# Patient Record
Sex: FEMALE | Race: WHITE | NOT HISPANIC OR LATINO | Employment: FULL TIME | URBAN - METROPOLITAN AREA
[De-identification: names, ages, dates, MRNs, and addresses within clinical notes are randomized per-mention and may not be internally consistent; named-entity substitution may affect disease eponyms.]

---

## 2017-03-29 ENCOUNTER — HOSPITAL ENCOUNTER (OUTPATIENT)
Dept: LAB | Facility: MEDICAL CENTER | Age: 32
End: 2017-03-29
Attending: OBSTETRICS & GYNECOLOGY
Payer: COMMERCIAL

## 2017-03-29 LAB
T3FREE SERPL-MCNC: 3.3 PG/ML (ref 2.4–4.2)
T4 FREE SERPL-MCNC: 0.68 NG/DL (ref 0.53–1.43)
TSH SERPL DL<=0.005 MIU/L-ACNC: 1.8 UIU/ML (ref 0.3–3.7)

## 2017-03-29 PROCEDURE — 84439 ASSAY OF FREE THYROXINE: CPT

## 2017-03-29 PROCEDURE — 36415 COLL VENOUS BLD VENIPUNCTURE: CPT

## 2017-03-29 PROCEDURE — 84443 ASSAY THYROID STIM HORMONE: CPT

## 2017-03-29 PROCEDURE — 84481 FREE ASSAY (FT-3): CPT

## 2017-09-02 ENCOUNTER — HOSPITAL ENCOUNTER (OUTPATIENT)
Dept: LAB | Facility: MEDICAL CENTER | Age: 32
End: 2017-09-02
Attending: FAMILY MEDICINE
Payer: COMMERCIAL

## 2017-09-02 LAB
25(OH)D3 SERPL-MCNC: 29 NG/ML (ref 30–100)
ALBUMIN SERPL BCP-MCNC: 3.2 G/DL (ref 3.2–4.9)
ALBUMIN/GLOB SERPL: 0.8 G/DL
ALP SERPL-CCNC: 78 U/L (ref 30–99)
ALT SERPL-CCNC: 32 U/L (ref 2–50)
ANION GAP SERPL CALC-SCNC: 6 MMOL/L (ref 0–11.9)
AST SERPL-CCNC: 25 U/L (ref 12–45)
BASOPHILS # BLD AUTO: 0.5 % (ref 0–1.8)
BASOPHILS # BLD: 0.03 K/UL (ref 0–0.12)
BILIRUB SERPL-MCNC: 0.4 MG/DL (ref 0.1–1.5)
BUN SERPL-MCNC: 16 MG/DL (ref 8–22)
CALCIUM SERPL-MCNC: 9.7 MG/DL (ref 8.5–10.5)
CHLORIDE SERPL-SCNC: 105 MMOL/L (ref 96–112)
CO2 SERPL-SCNC: 27 MMOL/L (ref 20–33)
CREAT SERPL-MCNC: 0.76 MG/DL (ref 0.5–1.4)
EOSINOPHIL # BLD AUTO: 0.16 K/UL (ref 0–0.51)
EOSINOPHIL NFR BLD: 2.8 % (ref 0–6.9)
ERYTHROCYTE [DISTWIDTH] IN BLOOD BY AUTOMATED COUNT: 41.8 FL (ref 35.9–50)
GFR SERPL CREATININE-BSD FRML MDRD: >60 ML/MIN/1.73 M 2
GLOBULIN SER CALC-MCNC: 4.2 G/DL (ref 1.9–3.5)
GLUCOSE SERPL-MCNC: 81 MG/DL (ref 65–99)
HCT VFR BLD AUTO: 40.6 % (ref 37–47)
HGB BLD-MCNC: 13.3 G/DL (ref 12–16)
IMM GRANULOCYTES # BLD AUTO: 0.01 K/UL (ref 0–0.11)
IMM GRANULOCYTES NFR BLD AUTO: 0.2 % (ref 0–0.9)
LYMPHOCYTES # BLD AUTO: 1.77 K/UL (ref 1–4.8)
LYMPHOCYTES NFR BLD: 30.8 % (ref 22–41)
MCH RBC QN AUTO: 29.4 PG (ref 27–33)
MCHC RBC AUTO-ENTMCNC: 32.8 G/DL (ref 33.6–35)
MCV RBC AUTO: 89.8 FL (ref 81.4–97.8)
MONOCYTES # BLD AUTO: 0.35 K/UL (ref 0–0.85)
MONOCYTES NFR BLD AUTO: 6.1 % (ref 0–13.4)
NEUTROPHILS # BLD AUTO: 3.42 K/UL (ref 2–7.15)
NEUTROPHILS NFR BLD: 59.6 % (ref 44–72)
NRBC # BLD AUTO: 0 K/UL
NRBC BLD AUTO-RTO: 0 /100 WBC
PLATELET # BLD AUTO: 274 K/UL (ref 164–446)
PMV BLD AUTO: 11.2 FL (ref 9–12.9)
POTASSIUM SERPL-SCNC: 4.5 MMOL/L (ref 3.6–5.5)
PROT SERPL-MCNC: 7.4 G/DL (ref 6–8.2)
RBC # BLD AUTO: 4.52 M/UL (ref 4.2–5.4)
SODIUM SERPL-SCNC: 138 MMOL/L (ref 135–145)
VIT B12 SERPL-MCNC: 371 PG/ML (ref 211–911)
WBC # BLD AUTO: 5.7 K/UL (ref 4.8–10.8)

## 2017-09-02 PROCEDURE — 82306 VITAMIN D 25 HYDROXY: CPT

## 2017-09-02 PROCEDURE — 80053 COMPREHEN METABOLIC PANEL: CPT

## 2017-09-02 PROCEDURE — 83721 ASSAY OF BLOOD LIPOPROTEIN: CPT

## 2017-09-02 PROCEDURE — 36415 COLL VENOUS BLD VENIPUNCTURE: CPT

## 2017-09-02 PROCEDURE — 82607 VITAMIN B-12: CPT

## 2017-09-02 PROCEDURE — 85025 COMPLETE CBC W/AUTO DIFF WBC: CPT

## 2017-09-04 LAB — LDLC SERPL-MCNC: 124 MG/DL (ref 0–129)

## 2017-12-07 ENCOUNTER — HOSPITAL ENCOUNTER (OUTPATIENT)
Facility: MEDICAL CENTER | Age: 32
End: 2017-12-07
Attending: OPHTHALMOLOGY | Admitting: OPHTHALMOLOGY
Payer: COMMERCIAL

## 2017-12-07 VITALS
HEIGHT: 68 IN | BODY MASS INDEX: 37.42 KG/M2 | OXYGEN SATURATION: 95 % | TEMPERATURE: 98.2 F | WEIGHT: 246.91 LBS | DIASTOLIC BLOOD PRESSURE: 79 MMHG | SYSTOLIC BLOOD PRESSURE: 122 MMHG | RESPIRATION RATE: 16 BRPM | HEART RATE: 76 BPM

## 2017-12-07 LAB
B-HCG FREE SERPL-ACNC: <5 MIU/ML
IHCGL IHCGL: NEGATIVE MIU/ML

## 2017-12-07 PROCEDURE — A9270 NON-COVERED ITEM OR SERVICE: HCPCS

## 2017-12-07 PROCEDURE — 160048 HCHG OR STATISTICAL LEVEL 1-5: Performed by: OPHTHALMOLOGY

## 2017-12-07 PROCEDURE — 700111 HCHG RX REV CODE 636 W/ 250 OVERRIDE (IP)

## 2017-12-07 PROCEDURE — 700101 HCHG RX REV CODE 250

## 2017-12-07 PROCEDURE — 160036 HCHG PACU - EA ADDL 30 MINS PHASE I: Performed by: OPHTHALMOLOGY

## 2017-12-07 PROCEDURE — 160009 HCHG ANES TIME/MIN: Performed by: OPHTHALMOLOGY

## 2017-12-07 PROCEDURE — 502585 HCHG PACK, MUSCLE: Performed by: OPHTHALMOLOGY

## 2017-12-07 PROCEDURE — 160029 HCHG SURGERY MINUTES - 1ST 30 MINS LEVEL 4: Performed by: OPHTHALMOLOGY

## 2017-12-07 PROCEDURE — 501425 HCHG SPONGE, WECKCEL SPEAR: Performed by: OPHTHALMOLOGY

## 2017-12-07 PROCEDURE — 500447 HCHG DRESSING, TEGADERM 8X12: Performed by: OPHTHALMOLOGY

## 2017-12-07 PROCEDURE — 501836 HCHG SUTURE EYE: Performed by: OPHTHALMOLOGY

## 2017-12-07 PROCEDURE — 700102 HCHG RX REV CODE 250 W/ 637 OVERRIDE(OP)

## 2017-12-07 PROCEDURE — 160002 HCHG RECOVERY MINUTES (STAT): Performed by: OPHTHALMOLOGY

## 2017-12-07 PROCEDURE — 84702 CHORIONIC GONADOTROPIN TEST: CPT

## 2017-12-07 PROCEDURE — 160035 HCHG PACU - 1ST 60 MINS PHASE I: Performed by: OPHTHALMOLOGY

## 2017-12-07 PROCEDURE — 160041 HCHG SURGERY MINUTES - EA ADDL 1 MIN LEVEL 4: Performed by: OPHTHALMOLOGY

## 2017-12-07 PROCEDURE — 500291 HCHG CAUTERY, OPTHTHALMIC: Performed by: OPHTHALMOLOGY

## 2017-12-07 RX ORDER — MOXIFLOXACIN 5 MG/ML
SOLUTION/ DROPS OPHTHALMIC
Status: DISCONTINUED | OUTPATIENT
Start: 2017-12-07 | End: 2017-12-07 | Stop reason: HOSPADM

## 2017-12-07 RX ORDER — BALANCED SALT SOLUTION 6.4; .75; .48; .3; 3.9; 1.7 MG/ML; MG/ML; MG/ML; MG/ML; MG/ML; MG/ML
SOLUTION OPHTHALMIC
Status: DISCONTINUED | OUTPATIENT
Start: 2017-12-07 | End: 2017-12-07 | Stop reason: HOSPADM

## 2017-12-07 RX ORDER — PHENYLEPHRINE HYDROCHLORIDE 25 MG/ML
SOLUTION/ DROPS OPHTHALMIC
Status: COMPLETED
Start: 2017-12-07 | End: 2017-12-07

## 2017-12-07 RX ORDER — ONDANSETRON 2 MG/ML
INJECTION INTRAMUSCULAR; INTRAVENOUS
Status: COMPLETED
Start: 2017-12-07 | End: 2017-12-07

## 2017-12-07 RX ORDER — SODIUM CHLORIDE, SODIUM LACTATE, POTASSIUM CHLORIDE, CALCIUM CHLORIDE 600; 310; 30; 20 MG/100ML; MG/100ML; MG/100ML; MG/100ML
INJECTION, SOLUTION INTRAVENOUS CONTINUOUS
Status: DISCONTINUED | OUTPATIENT
Start: 2017-12-07 | End: 2017-12-07 | Stop reason: HOSPADM

## 2017-12-07 RX ORDER — SCOLOPAMINE TRANSDERMAL SYSTEM 1 MG/1
PATCH, EXTENDED RELEASE TRANSDERMAL
Status: COMPLETED
Start: 2017-12-07 | End: 2017-12-07

## 2017-12-07 RX ORDER — CYCLOPENTOLATE HYDROCHLORIDE 10 MG/ML
SOLUTION/ DROPS OPHTHALMIC
Status: COMPLETED
Start: 2017-12-07 | End: 2017-12-07

## 2017-12-07 RX ADMIN — PHENYLEPHRINE HYDROCHLORIDE 1 DROP: 2.5 SOLUTION/ DROPS OPHTHALMIC at 10:30

## 2017-12-07 RX ADMIN — ONDANSETRON 4 MG: 2 INJECTION INTRAMUSCULAR; INTRAVENOUS at 16:05

## 2017-12-07 RX ADMIN — SODIUM CHLORIDE, SODIUM LACTATE, POTASSIUM CHLORIDE, CALCIUM CHLORIDE: 600; 310; 30; 20 INJECTION, SOLUTION INTRAVENOUS at 11:00

## 2017-12-07 RX ADMIN — SODIUM CHLORIDE, SODIUM LACTATE, POTASSIUM CHLORIDE, CALCIUM CHLORIDE 1000 ML: 600; 310; 30; 20 INJECTION, SOLUTION INTRAVENOUS at 16:06

## 2017-12-07 RX ADMIN — CYCLOPENTOLATE HYDROCHLORIDE 1 DROP: 10 SOLUTION/ DROPS OPHTHALMIC at 10:30

## 2017-12-07 RX ADMIN — SCOPALAMINE 1 PATCH: 1 PATCH, EXTENDED RELEASE TRANSDERMAL at 10:45

## 2017-12-07 ASSESSMENT — PAIN SCALES - GENERAL
PAINLEVEL_OUTOF10: 0
PAINLEVEL_OUTOF10: 2
PAINLEVEL_OUTOF10: 0
PAINLEVEL_OUTOF10: 2
PAINLEVEL_OUTOF10: 2

## 2017-12-08 LAB — EKG IMPRESSION: NORMAL

## 2017-12-08 PROCEDURE — 93005 ELECTROCARDIOGRAM TRACING: CPT | Performed by: EMERGENCY MEDICINE

## 2017-12-08 PROCEDURE — 99284 EMERGENCY DEPT VISIT MOD MDM: CPT

## 2017-12-08 PROCEDURE — 93005 ELECTROCARDIOGRAM TRACING: CPT

## 2017-12-08 PROCEDURE — 80053 COMPREHEN METABOLIC PANEL: CPT

## 2017-12-08 PROCEDURE — 83690 ASSAY OF LIPASE: CPT

## 2017-12-08 PROCEDURE — 85025 COMPLETE CBC W/AUTO DIFF WBC: CPT

## 2017-12-08 ASSESSMENT — PAIN SCALES - GENERAL: PAINLEVEL_OUTOF10: 2

## 2017-12-08 NOTE — DISCHARGE INSTRUCTIONS
ACTIVITY: Rest and take it easy for the first 24 hours.  A responsible adult is recommended to remain with you during that time.  It is normal to feel sleepy.  We encourage you to not do anything that requires balance, judgment or coordination.    MILD FLU-LIKE SYMPTOMS ARE NORMAL. YOU MAY EXPERIENCE GENERALIZED MUSCLE ACHES, THROAT IRRITATION, HEADACHE AND/OR SOME NAUSEA.    FOR 24 HOURS DO NOT:  Drive, operate machinery or run household appliances.  Drink beer or alcoholic beverages.   Make important decisions or sign legal documents.    SPECIAL INSTRUCTIONS: USE EYE DROPS-1 DROP IN EACH EYE THREE TIMES A DAY FOR 5 DAYS    DIET: To avoid nausea, slowly advance diet as tolerated, avoiding spicy or greasy foods for the first day.  Add more substantial food to your diet according to your physician's instructions.  Babies can be fed formula or breast milk as soon as they are hungry.  INCREASE FLUIDS AND FIBER TO AVOID CONSTIPATION.    SURGICAL DRESSING/BATHING: PER MD'S INSTRUCTIONS    FOLLOW-UP APPOINTMENT:  A follow-up appointment should be arranged with your doctor TOMORROW AS DIRECTED;     You should CALL YOUR PHYSICIAN if you develop:  Fever greater than 101 degrees F.  Pain not relieved by medication, or persistent nausea or vomiting.  Excessive bleeding (blood soaking through dressing) or unexpected drainage from the wound.  Extreme redness or swelling around the incision site, drainage of pus or foul smelling drainage.  Inability to urinate or empty your bladder within 8 hours.  Problems with breathing or chest pain.    You should call 911 if you develop problems with breathing or chest pain.  If you are unable to contact your doctor or surgical center, you should go to the nearest emergency room or urgent care center.  Physician's telephone #: [phone]    If any questions arise, call your doctor.  If your doctor is not available, please feel free to call the Surgical Center at [phone].  The Center is  open Monday through Friday from 7AM to 7PM.  You can also call the HEALTH HOTLINE open 24 hours/day, 7 days/week and speak to a nurse at [phone], or toll free at [phone].    A registered nurse may call you a few days after your surgery to see how you are doing after your procedure.    MEDICATIONS: Resume taking daily medication.  Take prescribed pain medication with food.  If no medication is prescribed, you may take non-aspirin pain medication if needed.  PAIN MEDICATION CAN BE VERY CONSTIPATING.  Take a stool softener or laxative such as senokot, pericolace, or milk of magnesia if needed.    Prescription given for NONE.  Last pain medication given at NONE.    If your physician has prescribed pain medication that includes Acetaminophen (Tylenol), do not take additional Acetaminophen (Tylenol) while taking the prescribed medication.    Depression / Suicide Risk    As you are discharged from this [facility] facility, it is important to learn how to keep safe from harming yourself.    Recognize the warning signs:  · Abrupt changes in personality, positive or negative- including increase in energy   · Giving away possessions  · Change in eating patterns- significant weight changes-  positive or negative  · Change in sleeping patterns- unable to sleep or sleeping all the time   · Unwillingness or inability to communicate  · Depression  · Unusual sadness, discouragement and loneliness  · Talk of wanting to die  · Neglect of personal appearance   · Rebelliousness- reckless behavior  · Withdrawal from people/activities they love  · Confusion- inability to concentrate     If you or a loved one observes any of these behaviors or has concerns about self-harm, here's what you can do:  · Talk about it- your feelings and reasons for harming yourself  · Remove any means that you might use to hurt yourself (examples: pills, rope, extension cords, firearm)  · Get professional help from the community (Mental Health,  Substance Abuse, psychological counseling)  · Do not be alone:Call your Safe Contact- someone whom you trust who will be there for you.  · Call your local CRISIS HOTLINE 420-1979 or 303-111-6259  · Call your local Children's Mobile Crisis Response Team [place] (145) 552-2440 or www.MeSixty  · Call the toll free National Suicide Prevention Hotlines   · National Suicide Prevention Lifeline 629-733-VWSA (4826)  · National Hope Line Network 800-SUICIDE (269-7284)

## 2017-12-08 NOTE — OR NURSING
1620: Care resumed, report rec'd from Judith EVANS, pt sleeping soundly, nausea better,  at side,   Pt taking sips of water, hard to open eyes,   1710: d/c instructions discussed with pt and family, pt ready to get up and get dressed,   1725: iv d/c'd, pt placed in wheelchair, awaiting  to bring car around,   1736: pt taken out to car in wheelchair.

## 2017-12-09 ENCOUNTER — HOSPITAL ENCOUNTER (EMERGENCY)
Facility: MEDICAL CENTER | Age: 32
End: 2017-12-09
Attending: EMERGENCY MEDICINE
Payer: COMMERCIAL

## 2017-12-09 VITALS
OXYGEN SATURATION: 96 % | TEMPERATURE: 96.8 F | BODY MASS INDEX: 38.38 KG/M2 | RESPIRATION RATE: 12 BRPM | WEIGHT: 252.43 LBS | SYSTOLIC BLOOD PRESSURE: 112 MMHG | HEART RATE: 65 BPM | DIASTOLIC BLOOD PRESSURE: 66 MMHG

## 2017-12-09 DIAGNOSIS — R10.13 EPIGASTRIC ABDOMINAL PAIN: ICD-10-CM

## 2017-12-09 LAB
ALBUMIN SERPL BCP-MCNC: 4.1 G/DL (ref 3.2–4.9)
ALBUMIN/GLOB SERPL: 1.6 G/DL
ALP SERPL-CCNC: 71 U/L (ref 30–99)
ALT SERPL-CCNC: 37 U/L (ref 2–50)
ANION GAP SERPL CALC-SCNC: 8 MMOL/L (ref 0–11.9)
AST SERPL-CCNC: 35 U/L (ref 12–45)
BASOPHILS # BLD AUTO: 0.3 % (ref 0–1.8)
BASOPHILS # BLD: 0.03 K/UL (ref 0–0.12)
BILIRUB SERPL-MCNC: 0.2 MG/DL (ref 0.1–1.5)
BUN SERPL-MCNC: 24 MG/DL (ref 8–22)
CALCIUM SERPL-MCNC: 9 MG/DL (ref 8.5–10.5)
CHLORIDE SERPL-SCNC: 103 MMOL/L (ref 96–112)
CO2 SERPL-SCNC: 26 MMOL/L (ref 20–33)
CREAT SERPL-MCNC: 0.95 MG/DL (ref 0.5–1.4)
EOSINOPHIL # BLD AUTO: 0.07 K/UL (ref 0–0.51)
EOSINOPHIL NFR BLD: 0.7 % (ref 0–6.9)
ERYTHROCYTE [DISTWIDTH] IN BLOOD BY AUTOMATED COUNT: 40.2 FL (ref 35.9–50)
GFR SERPL CREATININE-BSD FRML MDRD: >60 ML/MIN/1.73 M 2
GLOBULIN SER CALC-MCNC: 2.6 G/DL (ref 1.9–3.5)
GLUCOSE SERPL-MCNC: 99 MG/DL (ref 65–99)
HCT VFR BLD AUTO: 37.5 % (ref 37–47)
HGB BLD-MCNC: 12.8 G/DL (ref 12–16)
IMM GRANULOCYTES # BLD AUTO: 0.03 K/UL (ref 0–0.11)
IMM GRANULOCYTES NFR BLD AUTO: 0.3 % (ref 0–0.9)
LIPASE SERPL-CCNC: 54 U/L (ref 11–82)
LYMPHOCYTES # BLD AUTO: 2.65 K/UL (ref 1–4.8)
LYMPHOCYTES NFR BLD: 27.9 % (ref 22–41)
MCH RBC QN AUTO: 30.5 PG (ref 27–33)
MCHC RBC AUTO-ENTMCNC: 34.1 G/DL (ref 33.6–35)
MCV RBC AUTO: 89.3 FL (ref 81.4–97.8)
MONOCYTES # BLD AUTO: 0.49 K/UL (ref 0–0.85)
MONOCYTES NFR BLD AUTO: 5.2 % (ref 0–13.4)
NEUTROPHILS # BLD AUTO: 6.24 K/UL (ref 2–7.15)
NEUTROPHILS NFR BLD: 65.6 % (ref 44–72)
NRBC # BLD AUTO: 0 K/UL
NRBC BLD AUTO-RTO: 0 /100 WBC
PLATELET # BLD AUTO: 283 K/UL (ref 164–446)
PMV BLD AUTO: 10.4 FL (ref 9–12.9)
POTASSIUM SERPL-SCNC: 3.4 MMOL/L (ref 3.6–5.5)
PROT SERPL-MCNC: 6.7 G/DL (ref 6–8.2)
RBC # BLD AUTO: 4.2 M/UL (ref 4.2–5.4)
SODIUM SERPL-SCNC: 137 MMOL/L (ref 135–145)
WBC # BLD AUTO: 9.5 K/UL (ref 4.8–10.8)

## 2017-12-09 PROCEDURE — 700111 HCHG RX REV CODE 636 W/ 250 OVERRIDE (IP): Performed by: EMERGENCY MEDICINE

## 2017-12-09 PROCEDURE — 96372 THER/PROPH/DIAG INJ SC/IM: CPT

## 2017-12-09 RX ORDER — DICYCLOMINE HYDROCHLORIDE 10 MG/ML
20 INJECTION INTRAMUSCULAR ONCE
Status: COMPLETED | OUTPATIENT
Start: 2017-12-09 | End: 2017-12-09

## 2017-12-09 RX ADMIN — DICYCLOMINE HYDROCHLORIDE 20 MG: 10 INJECTION INTRAMUSCULAR at 02:10

## 2017-12-09 NOTE — OP REPORT
DATE OF SERVICE:  12/07/2017    SURGEON:  Kathleen Mix MD.    ASSISTANT:  Rosmery Singleton.    ANESTHESIA:  General.    ANESTHESIOLOGIST:  Haydee Pickering MD.    COMPLICATIONS:  None.    PREOPERATIVE DIAGNOSIS:  Exotropia.    POSTOPERATIVE DIAGNOSIS:  1.  Previous eye muscle surgery, both eyes.  2.  Bilateral medial rectus resection and advancement on Mersilene suture 6.0   mm.  3.  Bilateral examination under anesthesia.    Risks, benefits, and alternatives to the procedure were discussed with the   patient and she elected to proceed.     DESCRIPTION OF PROCEDURE:  The patient was brought to the operating room suite   in stable condition and inducted under general anesthesia without   complications.  Both eyes were prepped and draped in the usual sterile   ophthalmic fashion.  An inferonasal fornix incision was created to enter   conjunctiva and to Tenon's capsule of the right eye.  A Fitch hook followed   by a Anthony hook was used to hook the medial rectus muscle.  It was noted   that there was copious scarring and a stretched scar between the muscle and the   insertion site.  A 5-0 Mersilene suture on S14 spatula needles was used to   imbricate the muscle belly 5 mm posterior to the original insertion site.  The   intervening stretched scar was clamped and resected.  The cut edge of the   muscle was then placed, 1 mm anterior to the preoperative insertion site.  The   muscle was secured using partial-thickness scleral bites, 6-0 plain gut   sutures were used to close Tenon's capsule and conjunctiva.  The identical   procedure was performed for the left eye.  Prior to the eye muscle surgery,   bilateral examination showed a normal anterior and posterior segments   including dilated ophthalmoscopy.  Maxitrol ointment was placed on both eyes.    The patient tolerated the procedure well.  There were no complications.       ____________________________________     KATHLEEN MIX MD    [facility]    DD:   12/09/2017 14:00:54  DT:  12/09/2017 14:58:40    D#:  [id number]  Job#:  [id number]

## 2017-12-09 NOTE — ED NOTES
"Cassandra Marquez  32 y.o.  Chief Complaint   Patient presents with   • Abdominal Pain     x 1 hour. Denies n/v/d, fevers, or cold chills. \"It started right after I took tylenol with Codeine\"      Pain is mid epigastric. EKG complete. Protocol orders activated, blood drawn and sent  "

## 2017-12-09 NOTE — ED NOTES
" Pt's and SO states \"Give us more information on the medication!\" Pt and SO educated on medication. Her significant other also stating \"get me bottled water and with a cup of ice.\" He is agitated and impolite. Water provided to him. Pt medicated per MAR.  "

## 2017-12-09 NOTE — ED PROVIDER NOTES
"ED Provider Note    Scribed for Russ Tinajero M.D. by Sukh Navarro. 12/9/2017, 1:31 AM.    Primary care provider: Jaime Nuno D.O.  Means of arrival: walk-in  History obtained from: patient  History limited by: none    CHIEF COMPLAINT  Chief Complaint   Patient presents with   • Abdominal Pain     x 1 hour. Denies n/v/d, fevers, or cold chills. \"It started right after I took tylenol with Codeine\"        HPI  Cassandra Marquez is a 32 y.o. female who presents to the Emergency Department for evaluation of generalized abdominal pain onset 1 hour ago. Per patient, she had a recent bilateral strabismus surgery, for which she was prescribed Tylenol with Codeine. Patient reports she took one before going to bed tonight, and began experiencing abdominal pain 15 minutes afterwards. She describes her pain as a \"sharp, cramping\" pain on both sides of her abdomen that has been intermittent since onset.  She explains she has never experienced this type of pain before. The patient rates her pain as moderate. She endorses associated nausea. Patient does not note any modifying factors. The patient states her pain is improved on exam. Patient states she usually does not experience nausea with pain medications. She reports a history of gastric bypass 11 years ago. The patient denies shortness of breath, diarrhea, dysuria, vomiting.     REVIEW OF SYSTEMS  Pertinent positives include right and left sided abdominal pain. Pertinent negatives include shortness of breath, diarrhea, dysuria, nausea, vomiting. All other systems negative.    C.      PAST MEDICAL HISTORY   has a past medical history of Hashimoto's disease.    SURGICAL HISTORY   has a past surgical history that includes gastric bypass laparoscopic; eye surgery; and rectus repair (Bilateral, 12/7/2017).    SOCIAL HISTORY  Social History   Substance Use Topics   • Smoking status: Never Smoker   • Smokeless tobacco: Never Used   • Alcohol use No      Comment: pt " denies      History   Drug Use No       FAMILY HISTORY  Family History   Problem Relation Age of Onset   • Diabetes Father    • Diabetes Maternal Aunt    • Diabetes Paternal Grandmother    • Heart Disease Paternal Grandfather    • Hypertension Paternal Grandfather    • Stroke Paternal Grandfather        CURRENT MEDICATIONS  Home Medications     Reviewed by Neha Hargrove R.N. (Registered Nurse) on 12/08/17 at 2341  Med List Status: Complete   Medication Last Dose Status   acetaminophen-codeine #3 (TYLENOL #3) 300-30 MG Tab 12/8/2017 Active   thyroid (ARMOUR THYROID) 120 MG TABS 12/8/2017 Active                ALLERGIES  Allergies   Allergen Reactions   • Amoxicillin Hives   • Pediazole [Erythromycin-Sulfisoxazole] Rash       PHYSICAL EXAM  VITAL SIGNS: /66   Pulse 71   Temp 36 °C (96.8 °F) (Temporal)   Resp 16   Wt 114.5 kg (252 lb 6.8 oz)   LMP 11/07/2017   SpO2 100%   BMI 38.38 kg/m²     Constitutional: Well developed, Well nourished, mild distress.   HENT: Normocephalic, Atraumatic, Oropharynx moist.   Eyes: Conjunctiva normal, No discharge. No scleral icterus  Cardiovascular: Normal heart rate, Normal rhythm, No murmurs, equal pulses.   Pulmonary: Normal breath sounds, No respiratory distress, No wheezing, No rales, No rhonchi.  Abdomen: Obese. Soft, No tenderness, cannot reproduce pain, No masses, no rebound, no guarding. Negative Ram sign, no McBurney's point tenderness  Back: No CVA tenderness.   Musculoskeletal: No major deformities noted, No tenderness.   Skin: Warm, Dry, No erythema, No rash.   Neurologic: Alert & oriented x 3, Normal motor function,  No focal deficits noted.   Psychiatric: Affect normal, Judgment normal, Mood normal.       LABS  Results for orders placed or performed during the hospital encounter of 12/09/17   CBC WITH DIFFERENTIAL   Result Value Ref Range    WBC 9.5 4.8 - 10.8 K/uL    RBC 4.20 4.20 - 5.40 M/uL    Hemoglobin 12.8 12.0 - 16.0 g/dL    Hematocrit 37.5 37.0 -  47.0 %    MCV 89.3 81.4 - 97.8 fL    MCH 30.5 27.0 - 33.0 pg    MCHC 34.1 33.6 - 35.0 g/dL    RDW 40.2 35.9 - 50.0 fL    Platelet Count 283 164 - 446 K/uL    MPV 10.4 9.0 - 12.9 fL    Neutrophils-Polys 65.60 44.00 - 72.00 %    Lymphocytes 27.90 22.00 - 41.00 %    Monocytes 5.20 0.00 - 13.40 %    Eosinophils 0.70 0.00 - 6.90 %    Basophils 0.30 0.00 - 1.80 %    Immature Granulocytes 0.30 0.00 - 0.90 %    Nucleated RBC 0.00 /100 WBC    Neutrophils (Absolute) 6.24 2.00 - 7.15 K/uL    Lymphs (Absolute) 2.65 1.00 - 4.80 K/uL    Monos (Absolute) 0.49 0.00 - 0.85 K/uL    Eos (Absolute) 0.07 0.00 - 0.51 K/uL    Baso (Absolute) 0.03 0.00 - 0.12 K/uL    Immature Granulocytes (abs) 0.03 0.00 - 0.11 K/uL    NRBC (Absolute) 0.00 K/uL   COMP METABOLIC PANEL   Result Value Ref Range    Sodium 137 135 - 145 mmol/L    Potassium 3.4 (L) 3.6 - 5.5 mmol/L    Chloride 103 96 - 112 mmol/L    Co2 26 20 - 33 mmol/L    Anion Gap 8.0 0.0 - 11.9    Glucose 99 65 - 99 mg/dL    Bun 24 (H) 8 - 22 mg/dL    Creatinine 0.95 0.50 - 1.40 mg/dL    Calcium 9.0 8.5 - 10.5 mg/dL    AST(SGOT) 35 12 - 45 U/L    ALT(SGPT) 37 2 - 50 U/L    Alkaline Phosphatase 71 30 - 99 U/L    Total Bilirubin 0.2 0.1 - 1.5 mg/dL    Albumin 4.1 3.2 - 4.9 g/dL    Total Protein 6.7 6.0 - 8.2 g/dL    Globulin 2.6 1.9 - 3.5 g/dL    A-G Ratio 1.6 g/dL   LIPASE   Result Value Ref Range    Lipase 54 11 - 82 U/L   ESTIMATED GFR   Result Value Ref Range    GFR If African American >60 >60 mL/min/1.73 m 2    GFR If Non African American >60 >60 mL/min/1.73 m 2   EKG (ER)   Result Value Ref Range    Report       [facility] Emergency Dept.    Test Date:  2017  Pt Name:    BARRETT AMARAL               Department: ER  MRN:        [id number]                      Room:  Gender:                                 Technician: [id number]  :        [id number]                   Requested By:ER TRIAGE PROTOCOL  Order #:    [id number]                    Reading  MD:    Measurements  Intervals                                Axis  Rate:       71                           P:          61  WY:         160                          QRS:        57  QRSD:       76                           T:          40  QT:         384  QTc:        418    Interpretive Statements  SINUS RHYTHM  No previous ECG available for comparison        All labs reviewed by me.    EKG  12 Lead EKG interpreted by me shows a normal sinus rhythm at a rate of 71. Axis normal. No ST elevations. No T wave inversions. No prior EKG for comparison. Final impression: Normal EKG.      COURSE & MEDICAL DECISION MAKING  Pertinent Labs & Imaging studies reviewed. (See chart for details)    11:47 AM - Ordered estimated GFR, POC UA, CBC with differential, CMP, Lipase, EKG per protocol.     1:31 AM - Patient seen and examined at bedside. Patient will be treated with 20 mg Bentyl. I updated the patient on her lab results as above. I discussed the plan as above with the patient. She understood and verbalized agreement.      The differential diagnoses include but are not limited to: abdominal cramping, gastroenteritis, cholecystitis, biliary colic.      2:05 AM - Per RN, the patient is feeling much improved after given Bentyl and would like to be discharged. I will reevaluate the patient.     2:08 AM - I reevaluated the patient at bedside. The patient is feeling significantly improved and is ready for discharge. The patient was advised to follow-up with her primary care physician. She was given return precautions and welcomed to return to the ED with new or worsening symptoms. She understood and verbalized agreement.       Medical Decision Making: Patient feels much better and would like to go home. At this point time her labs are unremarkable. Her abdomen is soft nontender do not have a great cause for her pain. It may been some abdominal cramping secondary to the Tylenol with Codeine versus some biliary colic although she has  no pain or tenderness in the right upper quadrant this point in time. Given the normal labs and benign exam think the patient to be discharged home. She was given a dose of Bentyl to help with abdominal cramping. Pain is not ripping or tearing does not convert back to do not think his aortic dissection. Does not show any signs of pancreatitis.   The patient will return for new or worsening symptoms and is stable at the time of discharge.    The patient is referred to a primary physician for blood pressure management, diabetic screening, and for all other preventative health concerns.    DISPOSITION:  Patient will be discharged home in stable condition.    FOLLOW UP:  [address] [facility] [address]  [phone]    Schedule an appointment as soon as possible for a visit in 3 days            FINAL IMPRESSION  1. Epigastric abdominal pain          Sukh DONALDSON (Herman), am scribing for, and in the presence of, Russ Tinajero M.D.    Electronically signed by: Sukh Navarro (Herman), 12/9/2017    Russ DONALDSON M.D. personally performed the services described in this documentation, as scribed by Sukh Navarro in my presence, and it is both accurate and complete.    The note accurately reflects work and decisions made by me.  Russ Tinajero  12/9/2017  2:41 AM

## 2017-12-09 NOTE — DISCHARGE INSTRUCTIONS
Return if you have increasing pain, fever, severe vomiting, blood in vomit, or blood in stool.   Abdominal Pain, Adult  Many things can cause belly (abdominal) pain. Most times, the belly pain is not dangerous. Many cases of belly pain can be watched and treated at home.  HOME CARE   · Do not take medicines that help you go poop (laxatives) unless told to by your doctor.  · Only take medicine as told by your doctor.  · Eat or drink as told by your doctor. Your doctor will tell you if you should be on a special diet.  GET HELP IF:  · You do not know what is causing your belly pain.  · You have belly pain while you are sick to your stomach (nauseous) or have runny poop (diarrhea).  · You have pain while you pee or poop.  · Your belly pain wakes you up at night.  · You have belly pain that gets worse or better when you eat.  · You have belly pain that gets worse when you eat fatty foods.  · You have a fever.  GET HELP RIGHT AWAY IF:   · The pain does not go away within 2 hours.  · You keep throwing up (vomiting).  · The pain changes and is only in the right or left part of the belly.  · You have bloody or tarry looking poop.  MAKE SURE YOU:   · Understand these instructions.  · Will watch your condition.  · Will get help right away if you are not doing well or get worse.     This information is not intended to replace advice given to you by your health care provider. Make sure you discuss any questions you have with your health care provider.     Document Released: 06/05/2009 Document Revised: 01/08/2016 Document Reviewed: 08/27/2014  [employer] Interactive Patient Education ©2016 [employer]

## 2018-03-31 ENCOUNTER — HOSPITAL ENCOUNTER (OUTPATIENT)
Dept: LAB | Facility: MEDICAL CENTER | Age: 33
End: 2018-03-31
Attending: OBSTETRICS & GYNECOLOGY
Payer: COMMERCIAL

## 2018-03-31 LAB
25(OH)D3 SERPL-MCNC: 18 NG/ML (ref 30–100)
ALBUMIN SERPL BCP-MCNC: 4.5 G/DL (ref 3.2–4.9)
ALBUMIN/GLOB SERPL: 1.7 G/DL
ALP SERPL-CCNC: 74 U/L (ref 30–99)
ALT SERPL-CCNC: 28 U/L (ref 2–50)
ANION GAP SERPL CALC-SCNC: 6 MMOL/L (ref 0–11.9)
AST SERPL-CCNC: 22 U/L (ref 12–45)
BASOPHILS # BLD AUTO: 0.6 % (ref 0–1.8)
BASOPHILS # BLD: 0.03 K/UL (ref 0–0.12)
BILIRUB SERPL-MCNC: 0.5 MG/DL (ref 0.1–1.5)
BUN SERPL-MCNC: 16 MG/DL (ref 8–22)
CALCIUM SERPL-MCNC: 9.7 MG/DL (ref 8.5–10.5)
CHLORIDE SERPL-SCNC: 105 MMOL/L (ref 96–112)
CO2 SERPL-SCNC: 27 MMOL/L (ref 20–33)
CREAT SERPL-MCNC: 0.77 MG/DL (ref 0.5–1.4)
EOSINOPHIL # BLD AUTO: 0.14 K/UL (ref 0–0.51)
EOSINOPHIL NFR BLD: 2.7 % (ref 0–6.9)
ERYTHROCYTE [DISTWIDTH] IN BLOOD BY AUTOMATED COUNT: 39.1 FL (ref 35.9–50)
GLOBULIN SER CALC-MCNC: 2.7 G/DL (ref 1.9–3.5)
GLUCOSE SERPL-MCNC: 85 MG/DL (ref 65–99)
HCT VFR BLD AUTO: 40.2 % (ref 37–47)
HGB BLD-MCNC: 13.6 G/DL (ref 12–16)
IMM GRANULOCYTES # BLD AUTO: 0 K/UL (ref 0–0.11)
IMM GRANULOCYTES NFR BLD AUTO: 0 % (ref 0–0.9)
LYMPHOCYTES # BLD AUTO: 1.69 K/UL (ref 1–4.8)
LYMPHOCYTES NFR BLD: 33.1 % (ref 22–41)
MCH RBC QN AUTO: 29.8 PG (ref 27–33)
MCHC RBC AUTO-ENTMCNC: 33.8 G/DL (ref 33.6–35)
MCV RBC AUTO: 88 FL (ref 81.4–97.8)
MONOCYTES # BLD AUTO: 0.33 K/UL (ref 0–0.85)
MONOCYTES NFR BLD AUTO: 6.5 % (ref 0–13.4)
NEUTROPHILS # BLD AUTO: 2.92 K/UL (ref 2–7.15)
NEUTROPHILS NFR BLD: 57.1 % (ref 44–72)
NRBC # BLD AUTO: 0 K/UL
NRBC BLD-RTO: 0 /100 WBC
PLATELET # BLD AUTO: 278 K/UL (ref 164–446)
PMV BLD AUTO: 11 FL (ref 9–12.9)
POTASSIUM SERPL-SCNC: 4.7 MMOL/L (ref 3.6–5.5)
PROT SERPL-MCNC: 7.2 G/DL (ref 6–8.2)
RBC # BLD AUTO: 4.57 M/UL (ref 4.2–5.4)
SODIUM SERPL-SCNC: 138 MMOL/L (ref 135–145)
T3FREE SERPL-MCNC: 2.84 PG/ML (ref 2.4–4.2)
T4 FREE SERPL-MCNC: 0.71 NG/DL (ref 0.53–1.43)
TSH SERPL DL<=0.005 MIU/L-ACNC: 2.8 UIU/ML (ref 0.38–5.33)
WBC # BLD AUTO: 5.1 K/UL (ref 4.8–10.8)

## 2018-03-31 PROCEDURE — 84481 FREE ASSAY (FT-3): CPT

## 2018-03-31 PROCEDURE — 85025 COMPLETE CBC W/AUTO DIFF WBC: CPT

## 2018-03-31 PROCEDURE — 82306 VITAMIN D 25 HYDROXY: CPT

## 2018-03-31 PROCEDURE — 80053 COMPREHEN METABOLIC PANEL: CPT

## 2018-03-31 PROCEDURE — 36415 COLL VENOUS BLD VENIPUNCTURE: CPT

## 2018-03-31 PROCEDURE — 84443 ASSAY THYROID STIM HORMONE: CPT

## 2018-03-31 PROCEDURE — 84439 ASSAY OF FREE THYROXINE: CPT

## 2018-05-19 ENCOUNTER — HOSPITAL ENCOUNTER (OUTPATIENT)
Dept: LAB | Facility: MEDICAL CENTER | Age: 33
End: 2018-05-19
Attending: OBSTETRICS & GYNECOLOGY
Payer: COMMERCIAL

## 2018-05-19 LAB — 25(OH)D3 SERPL-MCNC: 23 NG/ML (ref 30–100)

## 2018-05-19 PROCEDURE — 82306 VITAMIN D 25 HYDROXY: CPT

## 2018-05-19 PROCEDURE — 36415 COLL VENOUS BLD VENIPUNCTURE: CPT

## 2018-08-14 ENCOUNTER — OFFICE VISIT (OUTPATIENT)
Dept: URGENT CARE | Facility: PHYSICIAN GROUP | Age: 33
End: 2018-08-14
Payer: COMMERCIAL

## 2018-08-14 VITALS
SYSTOLIC BLOOD PRESSURE: 102 MMHG | TEMPERATURE: 97.5 F | BODY MASS INDEX: 34.86 KG/M2 | DIASTOLIC BLOOD PRESSURE: 70 MMHG | RESPIRATION RATE: 16 BRPM | WEIGHT: 230 LBS | OXYGEN SATURATION: 97 % | HEART RATE: 82 BPM | HEIGHT: 68 IN
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
## 2018-08-14 DIAGNOSIS — S16.1XXA STRAIN OF NECK MUSCLE, INITIAL ENCOUNTER: ICD-10-CM

## 2018-08-14 PROCEDURE — 99203 OFFICE O/P NEW LOW 30 MIN: CPT | Performed by: PHYSICIAN ASSISTANT

## 2018-08-14 RX ORDER — THYROID 60 MG
TABLET ORAL
Status: ON HOLD | COMMUNITY
Start: 2018-06-26 | End: 2020-06-16

## 2018-08-14 ASSESSMENT — ENCOUNTER SYMPTOMS
COUGH: 0
MYALGIAS: 0
ABDOMINAL PAIN: 0
HEADACHES: 1
DIZZINESS: 0
NAUSEA: 0
FALLS: 0
VOMITING: 0
BACK PAIN: 0
EYE DISCHARGE: 0
EYE REDNESS: 0
FATIGUE: 1
NECK PAIN: 1
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
## 2018-10-04 ENCOUNTER — HOSPITAL ENCOUNTER (OUTPATIENT)
Dept: LAB | Facility: MEDICAL CENTER | Age: 33
End: 2018-10-04
Attending: OBSTETRICS & GYNECOLOGY
Payer: COMMERCIAL

## 2018-10-04 LAB
25(OH)D3 SERPL-MCNC: 20 NG/ML (ref 30–100)
T3FREE SERPL-MCNC: 3.3 PG/ML (ref 2.4–4.2)
T4 FREE SERPL-MCNC: 0.8 NG/DL (ref 0.53–1.43)
TSH SERPL DL<=0.005 MIU/L-ACNC: 3.3 UIU/ML (ref 0.38–5.33)

## 2018-10-04 PROCEDURE — 84439 ASSAY OF FREE THYROXINE: CPT

## 2018-10-04 PROCEDURE — 82306 VITAMIN D 25 HYDROXY: CPT

## 2018-10-04 PROCEDURE — 36415 COLL VENOUS BLD VENIPUNCTURE: CPT

## 2018-10-04 PROCEDURE — 84481 FREE ASSAY (FT-3): CPT

## 2018-10-04 PROCEDURE — 84443 ASSAY THYROID STIM HORMONE: CPT

## 2019-02-12 ENCOUNTER — HOSPITAL ENCOUNTER (OUTPATIENT)
Dept: RADIOLOGY | Facility: MEDICAL CENTER | Age: 34
End: 2019-02-12
Attending: OBSTETRICS & GYNECOLOGY
Payer: COMMERCIAL

## 2019-02-12 DIAGNOSIS — N94.5 SECONDARY DYSMENORRHEA: ICD-10-CM

## 2019-02-12 PROCEDURE — 76830 TRANSVAGINAL US NON-OB: CPT

## 2019-02-19 ENCOUNTER — HOSPITAL ENCOUNTER (OUTPATIENT)
Dept: LAB | Facility: MEDICAL CENTER | Age: 34
End: 2019-02-19
Attending: OBSTETRICS & GYNECOLOGY
Payer: COMMERCIAL

## 2019-02-19 LAB — PROGEST SERPL-MCNC: 0.46 NG/ML

## 2019-02-19 PROCEDURE — 36415 COLL VENOUS BLD VENIPUNCTURE: CPT

## 2019-02-19 PROCEDURE — 84144 ASSAY OF PROGESTERONE: CPT

## 2019-04-06 ENCOUNTER — HOSPITAL ENCOUNTER (OUTPATIENT)
Dept: LAB | Facility: MEDICAL CENTER | Age: 34
End: 2019-04-06
Attending: FAMILY MEDICINE
Payer: COMMERCIAL

## 2019-04-06 LAB
25(OH)D3 SERPL-MCNC: 41 NG/ML (ref 30–100)
T3FREE SERPL-MCNC: 3.19 PG/ML (ref 2.4–4.2)
T4 FREE SERPL-MCNC: 0.67 NG/DL (ref 0.53–1.43)
TSH SERPL DL<=0.005 MIU/L-ACNC: 3.25 UIU/ML (ref 0.38–5.33)

## 2019-04-06 PROCEDURE — 36415 COLL VENOUS BLD VENIPUNCTURE: CPT

## 2019-04-06 PROCEDURE — 84439 ASSAY OF FREE THYROXINE: CPT

## 2019-04-06 PROCEDURE — 84481 FREE ASSAY (FT-3): CPT

## 2019-04-06 PROCEDURE — 84443 ASSAY THYROID STIM HORMONE: CPT

## 2019-04-06 PROCEDURE — 82306 VITAMIN D 25 HYDROXY: CPT

## 2019-11-02 ENCOUNTER — HOSPITAL ENCOUNTER (OUTPATIENT)
Dept: LAB | Facility: MEDICAL CENTER | Age: 34
End: 2019-11-02
Attending: OBSTETRICS & GYNECOLOGY
Payer: COMMERCIAL

## 2019-11-02 LAB
25(OH)D3 SERPL-MCNC: 28 NG/ML (ref 30–100)
ABO GROUP BLD: NORMAL
ALBUMIN SERPL BCP-MCNC: 4 G/DL (ref 3.2–4.9)
ALBUMIN/GLOB SERPL: 1.3 G/DL
ALP SERPL-CCNC: 55 U/L (ref 30–99)
ALT SERPL-CCNC: 28 U/L (ref 2–50)
ANION GAP SERPL CALC-SCNC: 7 MMOL/L (ref 0–11.9)
AST SERPL-CCNC: 19 U/L (ref 12–45)
BASOPHILS # BLD AUTO: 0.3 % (ref 0–1.8)
BASOPHILS # BLD: 0.02 K/UL (ref 0–0.12)
BILIRUB SERPL-MCNC: 0.4 MG/DL (ref 0.1–1.5)
BLD GP AB SCN SERPL QL: NORMAL
BUN SERPL-MCNC: 13 MG/DL (ref 8–22)
CALCIUM SERPL-MCNC: 8.9 MG/DL (ref 8.5–10.5)
CHLORIDE SERPL-SCNC: 106 MMOL/L (ref 96–112)
CO2 SERPL-SCNC: 25 MMOL/L (ref 20–33)
CREAT SERPL-MCNC: 0.83 MG/DL (ref 0.5–1.4)
EOSINOPHIL # BLD AUTO: 0.13 K/UL (ref 0–0.51)
EOSINOPHIL NFR BLD: 2.2 % (ref 0–6.9)
ERYTHROCYTE [DISTWIDTH] IN BLOOD BY AUTOMATED COUNT: 44.5 FL (ref 35.9–50)
GLOBULIN SER CALC-MCNC: 3.1 G/DL (ref 1.9–3.5)
GLUCOSE SERPL-MCNC: 96 MG/DL (ref 65–99)
HBV SURFACE AG SER QL: NEGATIVE
HCT VFR BLD AUTO: 39.3 % (ref 37–47)
HCV AB SER QL: NEGATIVE
HGB BLD-MCNC: 12.9 G/DL (ref 12–16)
HIV 1+2 AB+HIV1 P24 AG SERPL QL IA: NON REACTIVE
IMM GRANULOCYTES # BLD AUTO: 0.02 K/UL (ref 0–0.11)
IMM GRANULOCYTES NFR BLD AUTO: 0.3 % (ref 0–0.9)
LYMPHOCYTES # BLD AUTO: 1.66 K/UL (ref 1–4.8)
LYMPHOCYTES NFR BLD: 27.5 % (ref 22–41)
MCH RBC QN AUTO: 30.4 PG (ref 27–33)
MCHC RBC AUTO-ENTMCNC: 32.8 G/DL (ref 33.6–35)
MCV RBC AUTO: 92.7 FL (ref 81.4–97.8)
MONOCYTES # BLD AUTO: 0.29 K/UL (ref 0–0.85)
MONOCYTES NFR BLD AUTO: 4.8 % (ref 0–13.4)
NEUTROPHILS # BLD AUTO: 3.92 K/UL (ref 2–7.15)
NEUTROPHILS NFR BLD: 64.9 % (ref 44–72)
NRBC # BLD AUTO: 0 K/UL
NRBC BLD-RTO: 0 /100 WBC
PLATELET # BLD AUTO: 260 K/UL (ref 164–446)
PMV BLD AUTO: 11.1 FL (ref 9–12.9)
POTASSIUM SERPL-SCNC: 4.1 MMOL/L (ref 3.6–5.5)
PROT SERPL-MCNC: 7.1 G/DL (ref 6–8.2)
RBC # BLD AUTO: 4.24 M/UL (ref 4.2–5.4)
RH BLD: NORMAL
RUBV AB SER QL: >500 IU/ML
SODIUM SERPL-SCNC: 138 MMOL/L (ref 135–145)
THYROPEROXIDASE AB SERPL-ACNC: 515.3 IU/ML (ref 0–9)
TREPONEMA PALLIDUM IGG+IGM AB [PRESENCE] IN SERUM OR PLASMA BY IMMUNOASSAY: NON REACTIVE
TSH SERPL DL<=0.005 MIU/L-ACNC: 4.03 UIU/ML (ref 0.38–5.33)
WBC # BLD AUTO: 6 K/UL (ref 4.8–10.8)

## 2019-11-02 PROCEDURE — 86780 TREPONEMA PALLIDUM: CPT

## 2019-11-02 PROCEDURE — 85025 COMPLETE CBC W/AUTO DIFF WBC: CPT

## 2019-11-02 PROCEDURE — 86900 BLOOD TYPING SEROLOGIC ABO: CPT

## 2019-11-02 PROCEDURE — 86803 HEPATITIS C AB TEST: CPT

## 2019-11-02 PROCEDURE — 87389 HIV-1 AG W/HIV-1&-2 AB AG IA: CPT

## 2019-11-02 PROCEDURE — 86376 MICROSOMAL ANTIBODY EACH: CPT

## 2019-11-02 PROCEDURE — 86901 BLOOD TYPING SEROLOGIC RH(D): CPT

## 2019-11-02 PROCEDURE — 82306 VITAMIN D 25 HYDROXY: CPT

## 2019-11-02 PROCEDURE — 84443 ASSAY THYROID STIM HORMONE: CPT

## 2019-11-02 PROCEDURE — 86800 THYROGLOBULIN ANTIBODY: CPT

## 2019-11-02 PROCEDURE — 87340 HEPATITIS B SURFACE AG IA: CPT

## 2019-11-02 PROCEDURE — 86850 RBC ANTIBODY SCREEN: CPT

## 2019-11-02 PROCEDURE — 80053 COMPREHEN METABOLIC PANEL: CPT

## 2019-11-02 PROCEDURE — 86762 RUBELLA ANTIBODY: CPT

## 2019-11-02 PROCEDURE — 36415 COLL VENOUS BLD VENIPUNCTURE: CPT

## 2019-11-04 LAB — THYROGLOB AB SERPL-ACNC: <0.9 IU/ML (ref 0–4)

## 2019-11-11 ENCOUNTER — HOSPITAL ENCOUNTER (OUTPATIENT)
Dept: RADIOLOGY | Facility: MEDICAL CENTER | Age: 34
End: 2019-11-11
Attending: OBSTETRICS & GYNECOLOGY
Payer: COMMERCIAL

## 2019-11-11 DIAGNOSIS — N91.1 AMENORRHEA, SECONDARY: ICD-10-CM

## 2019-11-11 PROCEDURE — 76801 OB US < 14 WKS SINGLE FETUS: CPT

## 2019-12-31 ENCOUNTER — HOSPITAL ENCOUNTER (OUTPATIENT)
Dept: LAB | Facility: MEDICAL CENTER | Age: 34
End: 2019-12-31
Attending: OBSTETRICS & GYNECOLOGY
Payer: COMMERCIAL

## 2019-12-31 LAB
ABO GROUP BLD: NORMAL
APPEARANCE UR: ABNORMAL
BACTERIA #/AREA URNS HPF: ABNORMAL /HPF
BASOPHILS # BLD AUTO: 0.3 % (ref 0–1.8)
BASOPHILS # BLD: 0.02 K/UL (ref 0–0.12)
BILIRUB UR QL STRIP.AUTO: NEGATIVE
BLD GP AB SCN SERPL QL: NORMAL
COLOR UR: ABNORMAL
EOSINOPHIL # BLD AUTO: 0.22 K/UL (ref 0–0.51)
EOSINOPHIL NFR BLD: 2.8 % (ref 0–6.9)
EPI CELLS #/AREA URNS HPF: ABNORMAL /HPF
ERYTHROCYTE [DISTWIDTH] IN BLOOD BY AUTOMATED COUNT: 44.8 FL (ref 35.9–50)
GLUCOSE UR STRIP.AUTO-MCNC: NEGATIVE MG/DL
HBV SURFACE AG SER QL: NEGATIVE
HCT VFR BLD AUTO: 39.8 % (ref 37–47)
HCV AB SER QL: NEGATIVE
HGB BLD-MCNC: 13.3 G/DL (ref 12–16)
HIV 1+2 AB+HIV1 P24 AG SERPL QL IA: NON REACTIVE
HYALINE CASTS #/AREA URNS LPF: ABNORMAL /LPF
IMM GRANULOCYTES # BLD AUTO: 0.02 K/UL (ref 0–0.11)
IMM GRANULOCYTES NFR BLD AUTO: 0.3 % (ref 0–0.9)
KETONES UR STRIP.AUTO-MCNC: 15 MG/DL
LEUKOCYTE ESTERASE UR QL STRIP.AUTO: ABNORMAL
LYMPHOCYTES # BLD AUTO: 1.71 K/UL (ref 1–4.8)
LYMPHOCYTES NFR BLD: 21.6 % (ref 22–41)
MCH RBC QN AUTO: 31 PG (ref 27–33)
MCHC RBC AUTO-ENTMCNC: 33.4 G/DL (ref 33.6–35)
MCV RBC AUTO: 92.8 FL (ref 81.4–97.8)
MICRO URNS: ABNORMAL
MONOCYTES # BLD AUTO: 0.43 K/UL (ref 0–0.85)
MONOCYTES NFR BLD AUTO: 5.4 % (ref 0–13.4)
NEUTROPHILS # BLD AUTO: 5.53 K/UL (ref 2–7.15)
NEUTROPHILS NFR BLD: 69.6 % (ref 44–72)
NITRITE UR QL STRIP.AUTO: NEGATIVE
NRBC # BLD AUTO: 0 K/UL
NRBC BLD-RTO: 0 /100 WBC
PH UR STRIP.AUTO: 7.5 [PH] (ref 5–8)
PLATELET # BLD AUTO: 265 K/UL (ref 164–446)
PMV BLD AUTO: 11.1 FL (ref 9–12.9)
PROT UR QL STRIP: 30 MG/DL
RBC # BLD AUTO: 4.29 M/UL (ref 4.2–5.4)
RBC # URNS HPF: ABNORMAL /HPF
RBC UR QL AUTO: NEGATIVE
RH BLD: NORMAL
RUBV AB SER QL: >500 IU/ML
SP GR UR STRIP.AUTO: 1.03
T4 SERPL-MCNC: 13 UG/DL (ref 4–12)
TREPONEMA PALLIDUM IGG+IGM AB [PRESENCE] IN SERUM OR PLASMA BY IMMUNOASSAY: NON REACTIVE
TSH SERPL DL<=0.005 MIU/L-ACNC: 4.29 UIU/ML (ref 0.38–5.33)
UROBILINOGEN UR STRIP.AUTO-MCNC: 1 MG/DL
WBC # BLD AUTO: 7.9 K/UL (ref 4.8–10.8)
WBC #/AREA URNS HPF: ABNORMAL /HPF

## 2019-12-31 PROCEDURE — 86780 TREPONEMA PALLIDUM: CPT

## 2019-12-31 PROCEDURE — 86850 RBC ANTIBODY SCREEN: CPT

## 2019-12-31 PROCEDURE — 86900 BLOOD TYPING SEROLOGIC ABO: CPT

## 2019-12-31 PROCEDURE — 86747 PARVOVIRUS ANTIBODY: CPT | Mod: 91

## 2019-12-31 PROCEDURE — 81001 URINALYSIS AUTO W/SCOPE: CPT

## 2019-12-31 PROCEDURE — 85025 COMPLETE CBC W/AUTO DIFF WBC: CPT

## 2019-12-31 PROCEDURE — 86901 BLOOD TYPING SEROLOGIC RH(D): CPT

## 2019-12-31 PROCEDURE — 86762 RUBELLA ANTIBODY: CPT

## 2019-12-31 PROCEDURE — 87086 URINE CULTURE/COLONY COUNT: CPT

## 2019-12-31 PROCEDURE — 86803 HEPATITIS C AB TEST: CPT

## 2019-12-31 PROCEDURE — 86777 TOXOPLASMA ANTIBODY: CPT

## 2019-12-31 PROCEDURE — 84443 ASSAY THYROID STIM HORMONE: CPT

## 2019-12-31 PROCEDURE — 87340 HEPATITIS B SURFACE AG IA: CPT

## 2019-12-31 PROCEDURE — 87389 HIV-1 AG W/HIV-1&-2 AB AG IA: CPT

## 2019-12-31 PROCEDURE — 36415 COLL VENOUS BLD VENIPUNCTURE: CPT

## 2019-12-31 PROCEDURE — 84436 ASSAY OF TOTAL THYROXINE: CPT

## 2020-01-01 LAB
B19V IGG SER IA-ACNC: 4.94 IV
B19V IGM SER IA-ACNC: 0.1 IV
T GONDII IGG SER-ACNC: <3 IU/ML

## 2020-01-02 LAB
BACTERIA UR CULT: NORMAL
SIGNIFICANT IND 70042: NORMAL
SITE SITE: NORMAL
SOURCE SOURCE: NORMAL

## 2020-03-07 ENCOUNTER — HOSPITAL ENCOUNTER (OUTPATIENT)
Dept: LAB | Facility: MEDICAL CENTER | Age: 35
End: 2020-03-07
Attending: OBSTETRICS & GYNECOLOGY
Payer: COMMERCIAL

## 2020-03-07 LAB
BASOPHILS # BLD AUTO: 0.3 % (ref 0–1.8)
BASOPHILS # BLD: 0.03 K/UL (ref 0–0.12)
EOSINOPHIL # BLD AUTO: 0.26 K/UL (ref 0–0.51)
EOSINOPHIL NFR BLD: 2.5 % (ref 0–6.9)
ERYTHROCYTE [DISTWIDTH] IN BLOOD BY AUTOMATED COUNT: 49.2 FL (ref 35.9–50)
GLUCOSE SERPL-MCNC: 76 MG/DL (ref 65–99)
HCT VFR BLD AUTO: 39.3 % (ref 37–47)
HGB BLD-MCNC: 12.7 G/DL (ref 12–16)
IMM GRANULOCYTES # BLD AUTO: 0.09 K/UL (ref 0–0.11)
IMM GRANULOCYTES NFR BLD AUTO: 0.9 % (ref 0–0.9)
LYMPHOCYTES # BLD AUTO: 1.45 K/UL (ref 1–4.8)
LYMPHOCYTES NFR BLD: 14.1 % (ref 22–41)
MCH RBC QN AUTO: 31.3 PG (ref 27–33)
MCHC RBC AUTO-ENTMCNC: 32.3 G/DL (ref 33.6–35)
MCV RBC AUTO: 96.8 FL (ref 81.4–97.8)
MONOCYTES # BLD AUTO: 0.47 K/UL (ref 0–0.85)
MONOCYTES NFR BLD AUTO: 4.6 % (ref 0–13.4)
NEUTROPHILS # BLD AUTO: 8 K/UL (ref 2–7.15)
NEUTROPHILS NFR BLD: 77.6 % (ref 44–72)
NRBC # BLD AUTO: 0 K/UL
NRBC BLD-RTO: 0 /100 WBC
PLATELET # BLD AUTO: 262 K/UL (ref 164–446)
PMV BLD AUTO: 11.5 FL (ref 9–12.9)
RBC # BLD AUTO: 4.06 M/UL (ref 4.2–5.4)
T4 FREE SERPL-MCNC: 0.73 NG/DL (ref 0.53–1.43)
TREPONEMA PALLIDUM IGG+IGM AB [PRESENCE] IN SERUM OR PLASMA BY IMMUNOASSAY: NON REACTIVE
TSH SERPL DL<=0.005 MIU/L-ACNC: 2.26 UIU/ML (ref 0.38–5.33)
WBC # BLD AUTO: 10.3 K/UL (ref 4.8–10.8)

## 2020-03-07 PROCEDURE — 86780 TREPONEMA PALLIDUM: CPT

## 2020-03-07 PROCEDURE — 82947 ASSAY GLUCOSE BLOOD QUANT: CPT

## 2020-03-07 PROCEDURE — 84443 ASSAY THYROID STIM HORMONE: CPT

## 2020-03-07 PROCEDURE — 84439 ASSAY OF FREE THYROXINE: CPT

## 2020-03-07 PROCEDURE — 36415 COLL VENOUS BLD VENIPUNCTURE: CPT

## 2020-03-07 PROCEDURE — 85025 COMPLETE CBC W/AUTO DIFF WBC: CPT

## 2020-05-26 ENCOUNTER — HOSPITAL ENCOUNTER (OUTPATIENT)
Dept: LAB | Facility: MEDICAL CENTER | Age: 35
End: 2020-05-26
Attending: OBSTETRICS & GYNECOLOGY
Payer: COMMERCIAL

## 2020-05-26 LAB
ALBUMIN SERPL BCP-MCNC: 3.3 G/DL (ref 3.2–4.9)
ALBUMIN/GLOB SERPL: 1.2 G/DL
ALP SERPL-CCNC: 89 U/L (ref 30–99)
ALT SERPL-CCNC: 24 U/L (ref 2–50)
ANION GAP SERPL CALC-SCNC: 11 MMOL/L (ref 7–16)
AST SERPL-CCNC: 26 U/L (ref 12–45)
BASOPHILS # BLD AUTO: 0.3 % (ref 0–1.8)
BASOPHILS # BLD: 0.03 K/UL (ref 0–0.12)
BILIRUB SERPL-MCNC: 0.2 MG/DL (ref 0.1–1.5)
BUN SERPL-MCNC: 11 MG/DL (ref 8–22)
CALCIUM SERPL-MCNC: 9 MG/DL (ref 8.5–10.5)
CHLORIDE SERPL-SCNC: 109 MMOL/L (ref 96–112)
CO2 SERPL-SCNC: 19 MMOL/L (ref 20–33)
CREAT SERPL-MCNC: 0.69 MG/DL (ref 0.5–1.4)
EOSINOPHIL # BLD AUTO: 0.15 K/UL (ref 0–0.51)
EOSINOPHIL NFR BLD: 1.4 % (ref 0–6.9)
ERYTHROCYTE [DISTWIDTH] IN BLOOD BY AUTOMATED COUNT: 45.7 FL (ref 35.9–50)
GLOBULIN SER CALC-MCNC: 2.7 G/DL (ref 1.9–3.5)
GLUCOSE SERPL-MCNC: 117 MG/DL (ref 65–99)
HCT VFR BLD AUTO: 35.5 % (ref 37–47)
HGB BLD-MCNC: 11.7 G/DL (ref 12–16)
IMM GRANULOCYTES # BLD AUTO: 0.11 K/UL (ref 0–0.11)
IMM GRANULOCYTES NFR BLD AUTO: 1 % (ref 0–0.9)
LYMPHOCYTES # BLD AUTO: 1.92 K/UL (ref 1–4.8)
LYMPHOCYTES NFR BLD: 17.6 % (ref 22–41)
MCH RBC QN AUTO: 31.1 PG (ref 27–33)
MCHC RBC AUTO-ENTMCNC: 33 G/DL (ref 33.6–35)
MCV RBC AUTO: 94.4 FL (ref 81.4–97.8)
MONOCYTES # BLD AUTO: 0.41 K/UL (ref 0–0.85)
MONOCYTES NFR BLD AUTO: 3.8 % (ref 0–13.4)
NEUTROPHILS # BLD AUTO: 8.31 K/UL (ref 2–7.15)
NEUTROPHILS NFR BLD: 75.9 % (ref 44–72)
NRBC # BLD AUTO: 0 K/UL
NRBC BLD-RTO: 0 /100 WBC
PLATELET # BLD AUTO: 236 K/UL (ref 164–446)
PMV BLD AUTO: 12 FL (ref 9–12.9)
POTASSIUM SERPL-SCNC: 3.9 MMOL/L (ref 3.6–5.5)
PROT SERPL-MCNC: 6 G/DL (ref 6–8.2)
RBC # BLD AUTO: 3.76 M/UL (ref 4.2–5.4)
SODIUM SERPL-SCNC: 139 MMOL/L (ref 135–145)
T4 FREE SERPL-MCNC: 0.99 NG/DL (ref 0.93–1.7)
TSH SERPL DL<=0.005 MIU/L-ACNC: 2.61 UIU/ML (ref 0.38–5.33)
URATE SERPL-MCNC: 4.6 MG/DL (ref 1.9–8.2)
WBC # BLD AUTO: 10.9 K/UL (ref 4.8–10.8)

## 2020-05-26 PROCEDURE — 36415 COLL VENOUS BLD VENIPUNCTURE: CPT

## 2020-05-26 PROCEDURE — 82570 ASSAY OF URINE CREATININE: CPT

## 2020-05-26 PROCEDURE — 85025 COMPLETE CBC W/AUTO DIFF WBC: CPT

## 2020-05-26 PROCEDURE — 84443 ASSAY THYROID STIM HORMONE: CPT

## 2020-05-26 PROCEDURE — 84439 ASSAY OF FREE THYROXINE: CPT

## 2020-05-26 PROCEDURE — 80053 COMPREHEN METABOLIC PANEL: CPT

## 2020-05-26 PROCEDURE — 84550 ASSAY OF BLOOD/URIC ACID: CPT

## 2020-05-26 PROCEDURE — 82043 UR ALBUMIN QUANTITATIVE: CPT

## 2020-05-27 ENCOUNTER — APPOINTMENT (OUTPATIENT)
Dept: RADIOLOGY | Facility: MEDICAL CENTER | Age: 35
End: 2020-05-27
Attending: OBSTETRICS & GYNECOLOGY
Payer: COMMERCIAL

## 2020-05-27 ENCOUNTER — HOSPITAL ENCOUNTER (OUTPATIENT)
Facility: MEDICAL CENTER | Age: 35
End: 2020-05-27
Attending: OBSTETRICS & GYNECOLOGY | Admitting: OBSTETRICS & GYNECOLOGY
Payer: COMMERCIAL

## 2020-05-27 VITALS — DIASTOLIC BLOOD PRESSURE: 80 MMHG | HEART RATE: 90 BPM | SYSTOLIC BLOOD PRESSURE: 140 MMHG

## 2020-05-27 LAB
ALBUMIN SERPL BCP-MCNC: 3.1 G/DL (ref 3.2–4.9)
ALBUMIN/GLOB SERPL: 1.1 G/DL
ALP SERPL-CCNC: 90 U/L (ref 30–99)
ALT SERPL-CCNC: 24 U/L (ref 2–50)
ANION GAP SERPL CALC-SCNC: 12 MMOL/L (ref 7–16)
APPEARANCE UR: CLEAR
AST SERPL-CCNC: 24 U/L (ref 12–45)
BACTERIA #/AREA URNS HPF: ABNORMAL /HPF
BASOPHILS # BLD AUTO: 0.2 % (ref 0–1.8)
BASOPHILS # BLD: 0.02 K/UL (ref 0–0.12)
BILIRUB SERPL-MCNC: 0.3 MG/DL (ref 0.1–1.5)
BILIRUB UR QL STRIP.AUTO: NEGATIVE
BUN SERPL-MCNC: 10 MG/DL (ref 8–22)
CALCIUM SERPL-MCNC: 9.3 MG/DL (ref 8.5–10.5)
CHLORIDE SERPL-SCNC: 101 MMOL/L (ref 96–112)
CO2 SERPL-SCNC: 21 MMOL/L (ref 20–33)
COLOR UR: YELLOW
CREAT SERPL-MCNC: 0.64 MG/DL (ref 0.5–1.4)
CREAT UR-MCNC: 119.19 MG/DL
CREAT UR-MCNC: 14.48 MG/DL
EOSINOPHIL # BLD AUTO: 0.1 K/UL (ref 0–0.51)
EOSINOPHIL NFR BLD: 1 % (ref 0–6.9)
EPI CELLS #/AREA URNS HPF: ABNORMAL /HPF
ERYTHROCYTE [DISTWIDTH] IN BLOOD BY AUTOMATED COUNT: 45.8 FL (ref 35.9–50)
GLOBULIN SER CALC-MCNC: 2.9 G/DL (ref 1.9–3.5)
GLUCOSE SERPL-MCNC: 83 MG/DL (ref 65–99)
GLUCOSE UR STRIP.AUTO-MCNC: NEGATIVE MG/DL
HCT VFR BLD AUTO: 36.3 % (ref 37–47)
HGB BLD-MCNC: 12 G/DL (ref 12–16)
HYALINE CASTS #/AREA URNS LPF: ABNORMAL /LPF
IMM GRANULOCYTES # BLD AUTO: 0.1 K/UL (ref 0–0.11)
IMM GRANULOCYTES NFR BLD AUTO: 1 % (ref 0–0.9)
KETONES UR STRIP.AUTO-MCNC: NEGATIVE MG/DL
LEUKOCYTE ESTERASE UR QL STRIP.AUTO: ABNORMAL
LYMPHOCYTES # BLD AUTO: 1.57 K/UL (ref 1–4.8)
LYMPHOCYTES NFR BLD: 15.1 % (ref 22–41)
MCH RBC QN AUTO: 31.3 PG (ref 27–33)
MCHC RBC AUTO-ENTMCNC: 33.1 G/DL (ref 33.6–35)
MCV RBC AUTO: 94.8 FL (ref 81.4–97.8)
MICRO URNS: ABNORMAL
MICROALBUMIN UR-MCNC: <1.2 MG/DL
MICROALBUMIN/CREAT UR: NORMAL MG/G (ref 0–30)
MONOCYTES # BLD AUTO: 0.44 K/UL (ref 0–0.85)
MONOCYTES NFR BLD AUTO: 4.2 % (ref 0–13.4)
NEUTROPHILS # BLD AUTO: 8.18 K/UL (ref 2–7.15)
NEUTROPHILS NFR BLD: 78.5 % (ref 44–72)
NITRITE UR QL STRIP.AUTO: NEGATIVE
NRBC # BLD AUTO: 0 K/UL
NRBC BLD-RTO: 0 /100 WBC
PH UR STRIP.AUTO: 8 [PH] (ref 5–8)
PLATELET # BLD AUTO: 232 K/UL (ref 164–446)
PMV BLD AUTO: 11 FL (ref 9–12.9)
POTASSIUM SERPL-SCNC: 4.1 MMOL/L (ref 3.6–5.5)
PROT SERPL-MCNC: 6 G/DL (ref 6–8.2)
PROT UR QL STRIP: NEGATIVE MG/DL
PROT UR-MCNC: <4 MG/DL (ref 0–15)
PROT/CREAT UR: NORMAL MG/G (ref 10–107)
RBC # BLD AUTO: 3.83 M/UL (ref 4.2–5.4)
RBC # URNS HPF: ABNORMAL /HPF
RBC UR QL AUTO: NEGATIVE
SODIUM SERPL-SCNC: 134 MMOL/L (ref 135–145)
SP GR UR STRIP.AUTO: 1
URATE SERPL-MCNC: 4.3 MG/DL (ref 1.9–8.2)
UROBILINOGEN UR STRIP.AUTO-MCNC: 0.2 MG/DL
WBC # BLD AUTO: 10.4 K/UL (ref 4.8–10.8)
WBC #/AREA URNS HPF: ABNORMAL /HPF

## 2020-05-27 PROCEDURE — 82570 ASSAY OF URINE CREATININE: CPT

## 2020-05-27 PROCEDURE — 84550 ASSAY OF BLOOD/URIC ACID: CPT

## 2020-05-27 PROCEDURE — 59025 FETAL NON-STRESS TEST: CPT

## 2020-05-27 PROCEDURE — 76815 OB US LIMITED FETUS(S): CPT

## 2020-05-27 PROCEDURE — 81001 URINALYSIS AUTO W/SCOPE: CPT

## 2020-05-27 PROCEDURE — 80053 COMPREHEN METABOLIC PANEL: CPT

## 2020-05-27 PROCEDURE — 84156 ASSAY OF PROTEIN URINE: CPT

## 2020-05-27 PROCEDURE — 36415 COLL VENOUS BLD VENIPUNCTURE: CPT

## 2020-05-27 PROCEDURE — 85025 COMPLETE CBC W/AUTO DIFF WBC: CPT

## 2020-05-27 NOTE — DISCHARGE INSTRUCTIONS
General Instructions:  · If you think you are in labor, time contractions (lying on your left side) from the beginning of one contraction to the beginning of the next contraction for at least one hour.  · Increase fluid intake: you should consume 10-12 8 oz glasses of non-caffeinated fluid per day.  · Report any pressure or burning on urination to your physician.  · Monitor fetal movement: If you notice an absence or decrease in fetal movement, drink a large glass of water and rest on your side.  If there is no increase in movement, call your physician or go to the hospital for further evaluation.  · Report any sudden, sharp abdominal pain.  · Report any bleeding.  Spotting or pinkish discharge is normal after vaginal exam.  You may also spot after sexual intercourse.    Pre-term Labor (<37 weeks):  Call your physician or return to the hospital if:  · You have painless regular contractions more than 4 in one hour.  · Your water breaks (remember time and color).  · You have menstrual-like cramps, a low dull backache or pressure in your pelvis or back.  · Your baby does not move enough to complete the daily kick count (10 movements in 2 hours).  · Your baby moves much less often than on the days before or you have not felt your baby move all day.  · Please review the MEDICATION LIST section of your AFTER VISIT SUMMARY document.  · Take your medication as prescribed    High Blood Pressure:  · Rest on your right or left side.  · Report any severe headaches, dizziness, blurred vision or spots before your eyes.  · Report excessive swelling of your hands, face or feet.  · Report epigastric pain (upper abdominal pain)      Other Instructions:  Please carefully review your entire AFTER VISIT SUMMARY document for all discharge instructions.

## 2020-05-27 NOTE — PROGRESS NOTES
PT presenting from the office after MD did US and possible low LOKESH. PT has also has elevated BP's. Orders for [facility] labs and US obtained.  +FM, denies LOF, VB or UC's    2384 Report to dr Benitez, tracing and results discussed, okay to DC PT    PT to return for decreased FM, LOF, VB or UC's

## 2020-05-31 LAB — GP B STREP DNA SPEC QL NAA+PROBE: NEGATIVE

## 2020-06-16 ENCOUNTER — HOSPITAL ENCOUNTER (INPATIENT)
Facility: MEDICAL CENTER | Age: 35
LOS: 1 days | End: 2020-06-17
Attending: OBSTETRICS & GYNECOLOGY | Admitting: OBSTETRICS & GYNECOLOGY
Payer: COMMERCIAL

## 2020-06-16 ENCOUNTER — ANESTHESIA EVENT (OUTPATIENT)
Dept: ANESTHESIOLOGY | Facility: MEDICAL CENTER | Age: 35
End: 2020-06-16
Payer: COMMERCIAL

## 2020-06-16 ENCOUNTER — ANESTHESIA (OUTPATIENT)
Dept: ANESTHESIOLOGY | Facility: MEDICAL CENTER | Age: 35
End: 2020-06-16
Payer: COMMERCIAL

## 2020-06-16 ENCOUNTER — APPOINTMENT (OUTPATIENT)
Dept: OBGYN | Facility: MEDICAL CENTER | Age: 35
End: 2020-06-16
Attending: OBSTETRICS & GYNECOLOGY
Payer: COMMERCIAL

## 2020-06-16 LAB
ALBUMIN SERPL BCP-MCNC: 2.8 G/DL (ref 3.2–4.9)
ALBUMIN/GLOB SERPL: 1 G/DL
ALP SERPL-CCNC: 88 U/L (ref 30–99)
ALT SERPL-CCNC: 23 U/L (ref 2–50)
ANION GAP SERPL CALC-SCNC: 10 MMOL/L (ref 7–16)
AST SERPL-CCNC: 22 U/L (ref 12–45)
BASOPHILS # BLD AUTO: 0.4 % (ref 0–1.8)
BASOPHILS # BLD: 0.04 K/UL (ref 0–0.12)
BILIRUB SERPL-MCNC: <0.2 MG/DL (ref 0.1–1.5)
BUN SERPL-MCNC: 12 MG/DL (ref 8–22)
CALCIUM SERPL-MCNC: 8.9 MG/DL (ref 8.5–10.5)
CHLORIDE SERPL-SCNC: 107 MMOL/L (ref 96–112)
CO2 SERPL-SCNC: 20 MMOL/L (ref 20–33)
CREAT SERPL-MCNC: 0.59 MG/DL (ref 0.5–1.4)
CREAT UR-MCNC: 28.47 MG/DL
EOSINOPHIL # BLD AUTO: 0.13 K/UL (ref 0–0.51)
EOSINOPHIL NFR BLD: 1.3 % (ref 0–6.9)
ERYTHROCYTE [DISTWIDTH] IN BLOOD BY AUTOMATED COUNT: 47.8 FL (ref 35.9–50)
GLOBULIN SER CALC-MCNC: 2.7 G/DL (ref 1.9–3.5)
GLUCOSE SERPL-MCNC: 87 MG/DL (ref 65–99)
HCT VFR BLD AUTO: 36.2 % (ref 37–47)
HGB BLD-MCNC: 11.7 G/DL (ref 12–16)
HOLDING TUBE BB 8507: NORMAL
IMM GRANULOCYTES # BLD AUTO: 0.08 K/UL (ref 0–0.11)
IMM GRANULOCYTES NFR BLD AUTO: 0.8 % (ref 0–0.9)
LYMPHOCYTES # BLD AUTO: 2.19 K/UL (ref 1–4.8)
LYMPHOCYTES NFR BLD: 22.3 % (ref 22–41)
MCH RBC QN AUTO: 31.5 PG (ref 27–33)
MCHC RBC AUTO-ENTMCNC: 32.3 G/DL (ref 33.6–35)
MCV RBC AUTO: 97.6 FL (ref 81.4–97.8)
MONOCYTES # BLD AUTO: 0.51 K/UL (ref 0–0.85)
MONOCYTES NFR BLD AUTO: 5.2 % (ref 0–13.4)
NEUTROPHILS # BLD AUTO: 6.86 K/UL (ref 2–7.15)
NEUTROPHILS NFR BLD: 70 % (ref 44–72)
NRBC # BLD AUTO: 0 K/UL
NRBC BLD-RTO: 0 /100 WBC
PLATELET # BLD AUTO: 205 K/UL (ref 164–446)
PMV BLD AUTO: 11.3 FL (ref 9–12.9)
POTASSIUM SERPL-SCNC: 3.9 MMOL/L (ref 3.6–5.5)
PROT SERPL-MCNC: 5.5 G/DL (ref 6–8.2)
PROT UR-MCNC: 5 MG/DL (ref 0–15)
PROT/CREAT UR: 176 MG/G (ref 10–107)
RBC # BLD AUTO: 3.71 M/UL (ref 4.2–5.4)
SODIUM SERPL-SCNC: 137 MMOL/L (ref 135–145)
URATE SERPL-MCNC: 5.5 MG/DL (ref 1.9–8.2)
WBC # BLD AUTO: 9.8 K/UL (ref 4.8–10.8)

## 2020-06-16 PROCEDURE — 3E033VJ INTRODUCTION OF OTHER HORMONE INTO PERIPHERAL VEIN, PERCUTANEOUS APPROACH: ICD-10-PCS | Performed by: OBSTETRICS & GYNECOLOGY

## 2020-06-16 PROCEDURE — 700111 HCHG RX REV CODE 636 W/ 250 OVERRIDE (IP): Performed by: OBSTETRICS & GYNECOLOGY

## 2020-06-16 PROCEDURE — 700105 HCHG RX REV CODE 258: Performed by: OBSTETRICS & GYNECOLOGY

## 2020-06-16 PROCEDURE — 82570 ASSAY OF URINE CREATININE: CPT

## 2020-06-16 PROCEDURE — 59409 OBSTETRICAL CARE: CPT

## 2020-06-16 PROCEDURE — 84550 ASSAY OF BLOOD/URIC ACID: CPT

## 2020-06-16 PROCEDURE — 700111 HCHG RX REV CODE 636 W/ 250 OVERRIDE (IP)

## 2020-06-16 PROCEDURE — 770002 HCHG ROOM/CARE - OB PRIVATE (112)

## 2020-06-16 PROCEDURE — 36415 COLL VENOUS BLD VENIPUNCTURE: CPT

## 2020-06-16 PROCEDURE — 3E0H7GC INTRODUCTION OF OTHER THERAPEUTIC SUBSTANCE INTO LOWER GI, VIA NATURAL OR ARTIFICIAL OPENING: ICD-10-PCS | Performed by: OBSTETRICS & GYNECOLOGY

## 2020-06-16 PROCEDURE — 85025 COMPLETE CBC W/AUTO DIFF WBC: CPT

## 2020-06-16 PROCEDURE — 303615 HCHG EPIDURAL/SPINAL ANESTHESIA FOR LABOR

## 2020-06-16 PROCEDURE — 700101 HCHG RX REV CODE 250: Performed by: ANESTHESIOLOGY

## 2020-06-16 PROCEDURE — 304965 HCHG RECOVERY SERVICES

## 2020-06-16 PROCEDURE — 700102 HCHG RX REV CODE 250 W/ 637 OVERRIDE(OP): Performed by: OBSTETRICS & GYNECOLOGY

## 2020-06-16 PROCEDURE — 84156 ASSAY OF PROTEIN URINE: CPT

## 2020-06-16 PROCEDURE — 80053 COMPREHEN METABOLIC PANEL: CPT

## 2020-06-16 PROCEDURE — A9270 NON-COVERED ITEM OR SERVICE: HCPCS | Performed by: OBSTETRICS & GYNECOLOGY

## 2020-06-16 RX ORDER — SODIUM CHLORIDE, SODIUM LACTATE, POTASSIUM CHLORIDE, CALCIUM CHLORIDE 600; 310; 30; 20 MG/100ML; MG/100ML; MG/100ML; MG/100ML
INJECTION, SOLUTION INTRAVENOUS PRN
Status: DISCONTINUED | OUTPATIENT
Start: 2020-06-16 | End: 2020-06-17 | Stop reason: HOSPADM

## 2020-06-16 RX ORDER — ACETAMINOPHEN 325 MG/1
325 TABLET ORAL EVERY 4 HOURS PRN
Status: DISCONTINUED | OUTPATIENT
Start: 2020-06-16 | End: 2020-06-17 | Stop reason: HOSPADM

## 2020-06-16 RX ORDER — LIDOCAINE HYDROCHLORIDE AND EPINEPHRINE 15; 5 MG/ML; UG/ML
INJECTION, SOLUTION EPIDURAL
Status: COMPLETED | OUTPATIENT
Start: 2020-06-16 | End: 2020-06-16

## 2020-06-16 RX ORDER — ROPIVACAINE HYDROCHLORIDE 2 MG/ML
INJECTION, SOLUTION EPIDURAL; INFILTRATION; PERINEURAL CONTINUOUS
Status: CANCELLED | OUTPATIENT
Start: 2020-06-16

## 2020-06-16 RX ORDER — MISOPROSTOL 200 UG/1
1000 TABLET ORAL
Status: COMPLETED | OUTPATIENT
Start: 2020-06-16 | End: 2020-06-16

## 2020-06-16 RX ORDER — MAGNESIUM OXIDE 400 MG/1
400 TABLET ORAL DAILY
Status: ON HOLD | COMMUNITY
End: 2020-06-17

## 2020-06-16 RX ORDER — ONDANSETRON 4 MG/1
4 TABLET, ORALLY DISINTEGRATING ORAL EVERY 6 HOURS PRN
Status: DISCONTINUED | OUTPATIENT
Start: 2020-06-16 | End: 2020-06-17 | Stop reason: HOSPADM

## 2020-06-16 RX ORDER — VITAMIN A ACETATE, BETA CAROTENE, ASCORBIC ACID, CHOLECALCIFEROL, .ALPHA.-TOCOPHEROL ACETATE, DL-, THIAMINE MONONITRATE, RIBOFLAVIN, NIACINAMIDE, PYRIDOXINE HYDROCHLORIDE, FOLIC ACID, CYANOCOBALAMIN, CALCIUM CARBONATE, FERROUS FUMARATE, ZINC OXIDE, CUPRIC OXIDE 3080; 12; 120; 400; 1; 1.84; 3; 20; 22; 920; 25; 200; 27; 10; 2 [IU]/1; UG/1; MG/1; [IU]/1; MG/1; MG/1; MG/1; MG/1; MG/1; [IU]/1; MG/1; MG/1; MG/1; MG/1; MG/1
1 TABLET, FILM COATED ORAL EVERY MORNING
Status: DISCONTINUED | OUTPATIENT
Start: 2020-06-17 | End: 2020-06-17 | Stop reason: HOSPADM

## 2020-06-16 RX ORDER — CARBOPROST TROMETHAMINE 250 UG/ML
250 INJECTION, SOLUTION INTRAMUSCULAR
Status: DISCONTINUED | OUTPATIENT
Start: 2020-06-16 | End: 2020-06-17 | Stop reason: HOSPADM

## 2020-06-16 RX ORDER — HYDROCODONE BITARTRATE AND ACETAMINOPHEN 10; 325 MG/1; MG/1
1 TABLET ORAL EVERY 4 HOURS PRN
Status: DISCONTINUED | OUTPATIENT
Start: 2020-06-16 | End: 2020-06-17 | Stop reason: HOSPADM

## 2020-06-16 RX ORDER — IBUPROFEN 600 MG/1
600 TABLET ORAL EVERY 6 HOURS PRN
Status: DISCONTINUED | OUTPATIENT
Start: 2020-06-16 | End: 2020-06-17 | Stop reason: HOSPADM

## 2020-06-16 RX ORDER — SODIUM CHLORIDE, SODIUM LACTATE, POTASSIUM CHLORIDE, AND CALCIUM CHLORIDE .6; .31; .03; .02 G/100ML; G/100ML; G/100ML; G/100ML
1000 INJECTION, SOLUTION INTRAVENOUS
Status: CANCELLED | OUTPATIENT
Start: 2020-06-16

## 2020-06-16 RX ORDER — ONDANSETRON 2 MG/ML
4 INJECTION INTRAMUSCULAR; INTRAVENOUS EVERY 6 HOURS PRN
Status: DISCONTINUED | OUTPATIENT
Start: 2020-06-16 | End: 2020-06-17 | Stop reason: HOSPADM

## 2020-06-16 RX ORDER — ASPIRIN 81 MG/1
81 TABLET, CHEWABLE ORAL DAILY
Status: ON HOLD | COMMUNITY
End: 2020-06-17

## 2020-06-16 RX ORDER — CARBOPROST TROMETHAMINE 250 UG/ML
250 INJECTION, SOLUTION INTRAMUSCULAR
Status: DISCONTINUED | OUTPATIENT
Start: 2020-06-16 | End: 2020-06-16 | Stop reason: HOSPADM

## 2020-06-16 RX ORDER — SODIUM CHLORIDE, SODIUM LACTATE, POTASSIUM CHLORIDE, CALCIUM CHLORIDE 600; 310; 30; 20 MG/100ML; MG/100ML; MG/100ML; MG/100ML
INJECTION, SOLUTION INTRAVENOUS CONTINUOUS
Status: DISCONTINUED | OUTPATIENT
Start: 2020-06-16 | End: 2020-06-16

## 2020-06-16 RX ORDER — METHYLERGONOVINE MALEATE 0.2 MG/ML
0.2 INJECTION INTRAVENOUS
Status: DISCONTINUED | OUTPATIENT
Start: 2020-06-16 | End: 2020-06-16 | Stop reason: HOSPADM

## 2020-06-16 RX ORDER — DOCUSATE SODIUM 100 MG/1
100 CAPSULE, LIQUID FILLED ORAL 2 TIMES DAILY PRN
Status: DISCONTINUED | OUTPATIENT
Start: 2020-06-16 | End: 2020-06-17 | Stop reason: HOSPADM

## 2020-06-16 RX ORDER — MISOPROSTOL 200 UG/1
600 TABLET ORAL
Status: DISCONTINUED | OUTPATIENT
Start: 2020-06-16 | End: 2020-06-17 | Stop reason: HOSPADM

## 2020-06-16 RX ORDER — SODIUM CHLORIDE, SODIUM LACTATE, POTASSIUM CHLORIDE, CALCIUM CHLORIDE 600; 310; 30; 20 MG/100ML; MG/100ML; MG/100ML; MG/100ML
INJECTION, SOLUTION INTRAVENOUS
Status: COMPLETED
Start: 2020-06-16 | End: 2020-06-16

## 2020-06-16 RX ORDER — SODIUM CHLORIDE, SODIUM LACTATE, POTASSIUM CHLORIDE, AND CALCIUM CHLORIDE .6; .31; .03; .02 G/100ML; G/100ML; G/100ML; G/100ML
250 INJECTION, SOLUTION INTRAVENOUS PRN
Status: CANCELLED | OUTPATIENT
Start: 2020-06-16

## 2020-06-16 RX ORDER — METHYLERGONOVINE MALEATE 0.2 MG/ML
0.2 INJECTION INTRAVENOUS
Status: DISCONTINUED | OUTPATIENT
Start: 2020-06-16 | End: 2020-06-17 | Stop reason: HOSPADM

## 2020-06-16 RX ORDER — LEVOTHYROXINE SODIUM 0.07 MG/1
75 TABLET ORAL
COMMUNITY

## 2020-06-16 RX ORDER — TERBUTALINE SULFATE 1 MG/ML
0.25 INJECTION, SOLUTION SUBCUTANEOUS PRN
Status: DISCONTINUED | OUTPATIENT
Start: 2020-06-16 | End: 2020-06-16 | Stop reason: HOSPADM

## 2020-06-16 RX ORDER — HYDROCODONE BITARTRATE AND ACETAMINOPHEN 5; 325 MG/1; MG/1
1 TABLET ORAL EVERY 4 HOURS PRN
Status: DISCONTINUED | OUTPATIENT
Start: 2020-06-16 | End: 2020-06-17 | Stop reason: HOSPADM

## 2020-06-16 RX ORDER — ROPIVACAINE HYDROCHLORIDE 2 MG/ML
INJECTION, SOLUTION EPIDURAL; INFILTRATION; PERINEURAL
Status: COMPLETED
Start: 2020-06-16 | End: 2020-06-16

## 2020-06-16 RX ORDER — HYDROXYZINE 50 MG/1
50 TABLET, FILM COATED ORAL EVERY 6 HOURS PRN
Status: DISCONTINUED | OUTPATIENT
Start: 2020-06-16 | End: 2020-06-16 | Stop reason: HOSPADM

## 2020-06-16 RX ADMIN — OXYTOCIN 2000 ML/HR: 10 INJECTION, SOLUTION INTRAMUSCULAR; INTRAVENOUS at 19:52

## 2020-06-16 RX ADMIN — IBUPROFEN 600 MG: 600 TABLET ORAL at 22:54

## 2020-06-16 RX ADMIN — OXYTOCIN 1 MILLI-UNITS/MIN: 10 INJECTION, SOLUTION INTRAMUSCULAR; INTRAVENOUS at 08:51

## 2020-06-16 RX ADMIN — SODIUM CHLORIDE, POTASSIUM CHLORIDE, SODIUM LACTATE AND CALCIUM CHLORIDE: 600; 310; 30; 20 INJECTION, SOLUTION INTRAVENOUS at 16:58

## 2020-06-16 RX ADMIN — FENTANYL CITRATE 100 MCG: 50 INJECTION INTRAMUSCULAR; INTRAVENOUS at 17:26

## 2020-06-16 RX ADMIN — OXYTOCIN 125 ML/HR: 10 INJECTION, SOLUTION INTRAMUSCULAR; INTRAVENOUS at 20:24

## 2020-06-16 RX ADMIN — MISOPROSTOL 1000 MCG: 200 TABLET ORAL at 20:17

## 2020-06-16 RX ADMIN — ROPIVACAINE HYDROCHLORIDE 200 MG: 2 INJECTION, SOLUTION EPIDURAL; INFILTRATION at 18:07

## 2020-06-16 RX ADMIN — SODIUM CHLORIDE, POTASSIUM CHLORIDE, SODIUM LACTATE AND CALCIUM CHLORIDE 125 ML: 600; 310; 30; 20 INJECTION, SOLUTION INTRAVENOUS at 08:52

## 2020-06-16 RX ADMIN — LIDOCAINE HYDROCHLORIDE,EPINEPHRINE BITARTRATE 5 ML: 15; .005 INJECTION, SOLUTION EPIDURAL; INFILTRATION; INTRACAUDAL; PERINEURAL at 18:58

## 2020-06-16 ASSESSMENT — PATIENT HEALTH QUESTIONNAIRE - PHQ9
1. LITTLE INTEREST OR PLEASURE IN DOING THINGS: NOT AT ALL
SUM OF ALL RESPONSES TO PHQ9 QUESTIONS 1 AND 2: 0
2. FEELING DOWN, DEPRESSED, IRRITABLE, OR HOPELESS: NOT AT ALL

## 2020-06-16 ASSESSMENT — FIBROSIS 4 INDEX: FIB4 SCORE: 0.78

## 2020-06-16 ASSESSMENT — LIFESTYLE VARIABLES: ALCOHOL_USE: NO

## 2020-06-16 NOTE — CARE PLAN
Problem: Pain  Goal: Alleviation of Pain or a reduction in pain to the patient's comfort goal  Outcome: PROGRESSING AS EXPECTED  Pt desires an epidural for pain management. Will notify RN when ready for interventions.       Problem: Infection  Goal: Will remain free from infection  Outcome: PROGRESSING AS EXPECTED  Pt instructed on hand washing and hand  stations at the door. Pt will be monitored for s/s of infection during her stay.

## 2020-06-16 NOTE — PROGRESS NOTES
0700 Report rcvd from CRISTINA Mane, at bedside. POC discussed, pt care assumed. Pt found to be lying in bed in no apparent distress.   1445 SRom, clear. 3/50/-2.   1540 FSE placed. Pt tolerated well. 3/50/-2.   1630 Pt desires epidural. Unable to get one at this time. Anesthesia in c/s.   1710 Report to CRISTINA Cedeño.

## 2020-06-17 VITALS
DIASTOLIC BLOOD PRESSURE: 70 MMHG | RESPIRATION RATE: 18 BRPM | SYSTOLIC BLOOD PRESSURE: 126 MMHG | HEART RATE: 74 BPM | TEMPERATURE: 97.9 F | OXYGEN SATURATION: 96 % | WEIGHT: 293 LBS | BODY MASS INDEX: 43.4 KG/M2 | HEIGHT: 69 IN

## 2020-06-17 LAB
ERYTHROCYTE [DISTWIDTH] IN BLOOD BY AUTOMATED COUNT: 44.8 FL (ref 35.9–50)
HCT VFR BLD AUTO: 32.5 % (ref 37–47)
HGB BLD-MCNC: 10.9 G/DL (ref 12–16)
MCH RBC QN AUTO: 31.2 PG (ref 27–33)
MCHC RBC AUTO-ENTMCNC: 33.5 G/DL (ref 33.6–35)
MCV RBC AUTO: 93.1 FL (ref 81.4–97.8)
PLATELET # BLD AUTO: 164 K/UL (ref 164–446)
PMV BLD AUTO: 11.4 FL (ref 9–12.9)
RBC # BLD AUTO: 3.49 M/UL (ref 4.2–5.4)
WBC # BLD AUTO: 11.8 K/UL (ref 4.8–10.8)

## 2020-06-17 PROCEDURE — 85027 COMPLETE CBC AUTOMATED: CPT

## 2020-06-17 PROCEDURE — 700102 HCHG RX REV CODE 250 W/ 637 OVERRIDE(OP): Performed by: OBSTETRICS & GYNECOLOGY

## 2020-06-17 PROCEDURE — 36415 COLL VENOUS BLD VENIPUNCTURE: CPT

## 2020-06-17 PROCEDURE — A9270 NON-COVERED ITEM OR SERVICE: HCPCS | Performed by: OBSTETRICS & GYNECOLOGY

## 2020-06-17 RX ORDER — PSEUDOEPHEDRINE HCL 30 MG
100 TABLET ORAL 2 TIMES DAILY PRN
Qty: 60 CAP | Refills: 1 | Status: SHIPPED | OUTPATIENT
Start: 2020-06-17

## 2020-06-17 RX ORDER — LEVOTHYROXINE SODIUM 0.07 MG/1
75 TABLET ORAL
Status: DISCONTINUED | OUTPATIENT
Start: 2020-06-17 | End: 2020-06-17 | Stop reason: HOSPADM

## 2020-06-17 RX ORDER — IBUPROFEN 600 MG/1
600 TABLET ORAL EVERY 6 HOURS PRN
Qty: 30 TAB | Refills: 3 | Status: SHIPPED | OUTPATIENT
Start: 2020-06-17

## 2020-06-17 RX ADMIN — IBUPROFEN 600 MG: 600 TABLET ORAL at 20:16

## 2020-06-17 RX ADMIN — IBUPROFEN 600 MG: 600 TABLET ORAL at 06:17

## 2020-06-17 RX ADMIN — PRENATAL WITH FERROUS FUM AND FOLIC ACID 1 TABLET: 3080; 920; 120; 400; 22; 1.84; 3; 20; 10; 1; 12; 200; 27; 25; 2 TABLET ORAL at 06:17

## 2020-06-17 RX ADMIN — ACETAMINOPHEN 325 MG: 325 TABLET, FILM COATED ORAL at 03:32

## 2020-06-17 RX ADMIN — LEVOTHYROXINE SODIUM 75 MCG: 0.07 TABLET ORAL at 11:23

## 2020-06-17 RX ADMIN — IBUPROFEN 600 MG: 600 TABLET ORAL at 12:49

## 2020-06-17 ASSESSMENT — EDINBURGH POSTNATAL DEPRESSION SCALE (EPDS)
THINGS HAVE BEEN GETTING ON TOP OF ME: NO, I HAVE BEEN COPING AS WELL AS EVER
I HAVE BLAMED MYSELF UNNECESSARILY WHEN THINGS WENT WRONG: NOT VERY OFTEN
THE THOUGHT OF HARMING MYSELF HAS OCCURRED TO ME: NEVER
I HAVE BEEN SO UNHAPPY THAT I HAVE BEEN CRYING: NO, NEVER
I HAVE FELT SCARED OR PANICKY FOR NO GOOD REASON: NO, NOT AT ALL
I HAVE FELT SAD OR MISERABLE: NO, NOT AT ALL
I HAVE BEEN SO UNHAPPY THAT I HAVE HAD DIFFICULTY SLEEPING: NOT AT ALL
I HAVE LOOKED FORWARD WITH ENJOYMENT TO THINGS: AS MUCH AS I EVER DID
I HAVE BEEN ABLE TO LAUGH AND SEE THE FUNNY SIDE OF THINGS: AS MUCH AS I ALWAYS COULD
I HAVE BEEN ANXIOUS OR WORRIED FOR NO GOOD REASON: NO, NOT AT ALL

## 2020-06-17 NOTE — CONSULTS
Met with MOB for an initial lactation visit.  MOB delivered her third baby yesterday, 06/16/20, at 1945 at 38.6 weeks gestation.  Breastfeeding risk factors are: advanced maternal age, chronic hypertension and history of hypothyroidism.  MOB denied having a history of low milk supply.  She stated she breast fed her first son for 1 year and her second son for 2 months.  MOB stated she had difficulty keeping up with her milk supply due to returning to work and stress.    Lactation assistance was offered, but MOB declined.  MOB stated infant is latching onto the breast without difficulty and is feeding well.  MOB denied pain and tissue damage to nipples and areolas with latch.  Infant was observed feeding at the left breast in the cross cradle position when this LC walked into the room.  Minor correction made with positioning.  Encouraged MOB to position infant's chin down towards the bottom of areola.      Breastfeeding Plan:  Offer infant the breast per feeding cues for a minimum of 8 or more breastfeeds in a 24 hour period.    Provided MOB with written information on the outpatient lactation assistance available to her through the [facility] and the [facility] [place] (via Zoom).    MOB verbalized understanding of all information provided to her and denied having any lactation questions and/or concerns at this time.  Encouraged MOB to call for lactation assistance as needed.

## 2020-06-17 NOTE — DISCHARGE PLANNING
Meds-to-Beds: Discharge prescription orders listed below delivered to patient's bedside. RN notified. Patient counseled.       Jimmy Cassandra James   Home Medication Instructions ISIDRA:[id number]    Printed on:06/17/20 1011   Medication Information                      docusate sodium 100 MG Cap  Take 100 mg by mouth 2 times a day as needed for Constipation.             ibuprofen (MOTRIN) 600 MG Tab  Take 1 Tab by mouth every 6 hours as needed (For cramping after delivery; do not give if patient is receiving ketorolac (Toradol)).               Emily Stephens, PharmD

## 2020-06-17 NOTE — ANESTHESIA TIME REPORT
Anesthesia Start and Stop Event Times     Date Time Event    6/16/2020 1857 Anesthesia Start     1909 Ready for Procedure     1945 Anesthesia Stop        Responsible Staff  06/16/20    Name Role Begin End    Robert Stern M.D. Anesth 1857 1945        Preop Diagnosis (Free Text):  Pre-op Diagnosis             Preop Diagnosis (Codes):    Post op Diagnosis  Vaginal delivery      Premium Reason  A. 3PM - 7AM    Comments:

## 2020-06-17 NOTE — ANESTHESIA PREPROCEDURE EVALUATION
Relevant Problems   No relevant active problems   hypothyroid  Obesity      Physical Exam    Airway   Mallampati: III  TM distance: <3 FB  Neck ROM: full       Cardiovascular   Rhythm: regular  Rate: normal     Dental    Pulmonary   Breath sounds clear to auscultation     Abdominal    Neurological - normal exam                 Anesthesia Plan    ASA 2       Plan - epidural   Neuraxial block will be labor analgesia              Pertinent diagnostic labs and testing reviewed    Informed Consent:    Anesthetic plan and risks discussed with patient.

## 2020-06-17 NOTE — ANESTHESIA PROCEDURE NOTES
Epidural Block    Date/Time: 6/16/2020 6:58 PM  Performed by: Robert Stern M.D.  Authorized by: Robert Stern M.D.     Patient Location:  OB  Start Time:  6/16/2020 6:58 PM  Reason for Block: labor analgesia    patient identified, IV checked, site marked, risks and benefits discussed, surgical consent, monitors and equipment checked, pre-op evaluation and timeout performed    Patient Position:  Sitting  Prep: ChloraPrep, patient draped and sterile technique    Monitoring:  Blood pressure, continuous pulse oximetry and heart rate  Approach:  Midline  Location:  L3-L4  Injection Technique:  ALLY air  Skin infiltration:  Lidocaine  Strength:  1%  Dose:  5ml  Needle Type:  Tuohy  Needle Gauge:  17 G  Needle Length:  3.5 in  Loss of resistance::  8  Catheter Size:  19 G  Catheter at Skin Depth:  18  Test Dose Result:  Negative

## 2020-06-17 NOTE — L&D DELIVERY NOTE
LABOR AND DELIVERY    DELIVERY SUMMARY    STAGE I LABOR:    34 yo  at 38w6d based upon a 12w6d u/s performed on 19 who presents to labor and delivery for IOL secondary to chronic hypertension with worsening blood pressures.  At the time of admission, her cervix was 1-2/50%/-2.  She was started on IV pitocin per protocol.  She had SROM clear fluid at 1445.  She received an epidural for labor analgesia and progressed to completely dilated at 1910.  Category I-II tracing during stage I labor.  Occasional variable and early decelerations noted.      STAGE II LABOR:  35 MINUTES.    I was present for a  of a viable female infant at 1945.  The vertex of the infant delivered without difficulty in the direct OP position.  A nuchal cord was palpated and reduced.  The remainder of the infant delivered without difficulty.  The infant was placed on the mother's abdomen.  The infant's mouth and nose were bulb suctioned and delayed cord clamping was performed.  The cord was then clamped and cut.  A segment of cord was clamped and cut for a cord gas but this was held as the APGAR scores were 8 at one minute and 9 at five minutes.    The patient received IV pitocin after delivery of the infant.    The infant weight is pending.    STAGE III LABOR:  7 MINUTES     The placenta delivered intact with a 3-vessel cord at 1952.  Marginal cord insertion noted.      The patient's perineum was examined and found to be intact.    Bimanual uterine massage and exploration performed and small clots removed from the lower uterine segment.      Patient received 1000 mcg of cytotec per rectum x 1 after the above examination.    EBL - 300 cc

## 2020-06-17 NOTE — H&P
DATE OF ADMISSION:  2020    ADMISSION DIAGNOSES:  1.  Intrauterine pregnancy at 38+6 weeks' gestation.  2.  Induction of labor for chronic hypertension with worsening blood   pressures.  3.  Group B streptococcus negative.  4.  Advanced maternal age.  5.  Morbid obesity.  6.  Hypothyroidism.    HISTORY OF PRESENT ILLNESS:  The patient was admitted as a 35-year-old    3, para 2-0-0-2 at 38+6 weeks' gestation based upon a 12+6 week ultrasound   performed on 2019.  The patient presented to labor and delivery for   induction of labor secondary to chronic hypertension with worsening blood   pressures.    The patient reported positive fetal movement.  She denied vaginal bleeding,   leakage of fluid, or regular and painful uterine contractions.  The patient   denied headaches, nausea, vomiting, right upper quadrant pain, vision changes,   and increased swelling.    The patient has [facility] and the   maternal fetal medicine specialist at [facility]   recommended that the patient be induced after 38 weeks' gestation secondary to   her chronic hypertension and advanced maternal age.    PRENATAL CARE:  With Dr. Pam Benitez, first visit on 2019 at 12+2   weeks' gestation, total visits 10, total weight gain 26 pounds.  Third   trimester blood pressures 119-132/69-90.    PRENATAL LABS:  GBS negative on 2020.  GC chlamydia negative, negative.    Hep B surface antigen negative, hep C viral antibody negative, rubella   immune, HIV negative, RPR nonreactive.  Blood type A positive, antibody screen   negative, 1-hour GCT less than 135.    OBSTETRIC ULTRASOUND:  1.  On 2019 at 7+1 weeks' gestation, DAVID 2020.  2.  2019 at 12+6 weeks' gestation, DAVID 2020.  3.  2020 at 17+5 weeks' gestation, DAVID 2020.  4.  2020 at 18+1 week gestation, DAVID 2020.  5.  02/10/2020 at 20+4 weeks' gestation, DAVID 2020.   Transverse   presentation.  Estimated fetal weight 387 g, 14 ounces, anterior low lying   placenta, no previa, cervix 3.9 cm.  6.  On 2020 at 26+4 weeks' gestation, breech presentation.  Estimated   fetal weight 1021 g, 2 pounds 4 ounces, fetal heart rate 147 beats per minute,   anterior placenta, no previa.  7.  2020 at 33+6 weeks' gestation, DAVID 2349 g, 5 pounds 3 ounces, 84th   percentile, female fetus,  vertex presentation, fundal placenta, no previa,   area of bowel that is enlarged measuring 21 mm, normal LOKESH.  Per Dr. Quispe the   bowel dilatation resolved.    OBSTETRICAL HISTORY:  NSVDs x3.    PAST MEDICAL HISTORY:  Hypothyroidism, chronic hypertension.    ISSUES THIS PREGNANCY:  Preexisting hypertension, advanced maternal age,   history of preeclampsia, hypothyroidism.    MEDICATIONS:  Prenatal vitamins and levothyroxine.    SOCIAL HISTORY:  She is .  She denies alcohol, tobacco, or drugs of   abuse.    PHYSICAL EXAMINATION:  VITAL SIGNS:  Blood pressure 144/77, pulse 88, temperature 98.2.  GENERAL:  Alert, awake and oriented x3, in no acute distress.  ABDOMEN:  Gravid, vertex by Leopold's, estimated fetal weight 3400 g.  PELVIC:  Sterile vaginal exam, her cervix is 2 cm, 50% effaced, -2 station.    DIAGNOSTIC DATA:  [place], she is kristina irregularly.  External fetal   monitoring category 1 tracing, reactive, without decelerations.    LABORATORY DATA:  On admission, white count 9.8, hemoglobin 11.7, hematocrit   36.2, platelets 205.    ASSESSMENT AND PLAN:  A 35-year-old  3, para 2-0-0-2 at 38+6 weeks'   gestation based upon a 12+6 week ultrasound performed on 2019 who   presents to labor and delivery for induction of labor secondary to chronic   hypertension with worsening blood pressures, who is GBS negative and has a   favorable cervix.  The patient will be admitted to labor and delivery.  She   will be started on IV Pitocin per protocol.  She may have an epidural for    labor analgesia.  She will undergo amniotomy if needed, but did have SROM   clear fluid at 1445 and we anticipate a normal spontaneous vaginal delivery.    The pediatrician was to be made aware of the  ultrasound that   demonstrated possible bowel dilatation.       ____________________________________     LIZ BELTRAN MD    HTA / NTS    DD:  2020 20:22:19  DT:  2020 23:06:36    D#:  [id number]  Job#:  [id number]

## 2020-06-17 NOTE — PROGRESS NOTES
190: Assumed care of patient, Report Received from MARLIN Miller RN. Gaitan, Gestation 38.6.     Patient in bed, awake, RN at bedside, denies pain, pleasant affect. Epidural infusing. Mario WASHBURN at Bedside. POC discussed, all questions answered.     Patient would like FOBMario at bedside for delivery phase. Patient would like skin to skin, FOB to cut cord after pulsating, breast feeding with Similac as necessary.     EFM used, discussed and in place.     Patient and family deny questions regarding care since arriving at [facility]. Dry erase board updated with RN contact information, discussed. Patient and family encouraged to call RN with all questions, concerns and needs.     : Dr. Benitez at  for SVE and placement of IUPC. Pt C/+1. Room setup for delivery.     :  of viable female by Dr. Benitez.  [place] placed to Mother's abdomen. APGARS 8/9    : Report given to RENEE Watts RN on L&D floor. Stefanie to transfer pt to PP. Fundus firm, VSS.

## 2020-06-17 NOTE — ANESTHESIA QCDR
2019 [facility] Clinical Data Registry (for Quality Improvement)     Postoperative nausea/vomiting risk protocol (Adult = 18 yrs and Pediatric 3-17 yrs)- (430 and 463)  General inhalation anesthetic (NOT TIVA) with PONV risk factors: No  Provision of anti-emetic therapy with at least 2 different classes of agents: N/A  Patient DID NOT receive anti-emetic therapy and reason is documented in Medical Record: N/A    Multimodal Pain Management- (477)  Non-emergent surgery AND patient age >= 18: No  Use of Multimodal Pain Management, two or more drugs and/or interventions, NOT including systemic opioids:   Exception: Documented allergy to multiple classes of analgesics:     Smoking Abstinence (404)  Patient is current smoker (cigarette, pipe, e-cig, marijuanna): No  Elective Surgery:   Abstinence instructions provided prior to day of surgery:   Patient abstained from smoking on day of surgery:     Pre-Op Beta-Blocker in Isolated CABG (44)  Isolated CABG AND patient age >= 18: No  Beta-blocker admin within 24 hours of surgical incision:   Exception:of medical reason(s) for not administering beta blocker within 24 hours prior to surgical incision (e.g., not  indicated,other medical reason):     PACU assessment of acute postoperative pain prior to Anesthesia Care End- Applies to Patients Age = 18- (ABG7)  Initial PACU pain score is which of the following: < 7/10  Patient unable to report pain score: N/A    Post-anesthetic transfer of care checklist/protocol to PACU/ICU- (426 and 427)  Upon conclusion of case, patient transferred to which of the following locations: PACU/Non-ICU  Use of transfer checklist/protocol: Yes  Exclusion: Service Performed in Patient Hospital Room (and thus did not require transfer): N/A  Unplanned admission to ICU related to anesthesia service up through end of PACU care- (MD51)  Unplanned admission to ICU (not initially anticipated at anesthesia start time): No

## 2020-06-17 NOTE — L&D DELIVERY NOTE
DATE OF SERVICE:  2020    Stage I labor:  The patient was admitted as a 35-year-old  3, para   2-0-0-2 at 38+6 weeks' gestation based upon a 12+6 weeks' ultrasound performed   on 2019.  The patient was admitted to labor and delivery for induction   of labor secondary to chronic hypertension with worsening blood pressures.  At   the time of admission, her cervix was 1-2 cm dilated, 50% effaced, -2   station.  The patient was started on IV Pitocin per protocol.  The patient had   spontaneous rupture of membranes of clear fluid at 1445.  The patient   received an epidural for labor analgesia.  The patient progressed to   completely dilated at 1910 on 2020.    Category 1-2 tracing during stage I labor.    Occasional variable and early decelerations noted.    Stage II labor:  35 minutes.  I was present for normal spontaneous vaginal   delivery of a vigorous and viable female infant at 1945.  The patient was in   Romulo for maternal expulsive efforts.  The vertex of the infant delivered   without difficulty in the direct OP position.  A nuchal cord x1 was palpated   and reduced.  The remainder of the infant delivered without difficulty.  The   infant was placed on the mother's abdomen.  The infant's mouth and nose were   bulb suctioned.  Delayed cord clamping was performed.  The cord was then   clamped and cut.  A segment of cord was clamped and cut for cord gas, but this   was held as the Apgars scores were 8 at one minute and 9 at five minutes.    Terminal meconium was noted.    The patient received IV Pitocin after delivery of the infant.  The infant   weight is pending.    Stage III labor:  7 minutes.    The placenta delivered intact with a 3-vessel cord at 1952.  Marginal cord   insertion was noted.  The patient's perineum was examined and found to be   intact.    Bimanual uterine massage and exploration were performed and small clots were   removed from the lower uterine segment.    The  patient received 1000 mcg of Cytotec per rectum x1 after the above   examination.    ESTIMATED BLOOD LOSS:  300  mL.    The patient's fundus was firm 1 below the umbilicus after delivery.       ____________________________________     LIZ BELTRAN MD    HTA / NTS    DD:  06/16/2020 20:14:28  DT:  06/17/2020 00:40:36    D#:  [id number]  Job#:  [id number]

## 2020-06-17 NOTE — PROGRESS NOTES
Report received from Kelly NAVARRO RN. Assumed care of patient. Infant currently latched at patient breast. FOB at bedside and supportive. Patient left leg still significantly numb. Will get patient up to bathroom when numbness subsides.     2215- Patient wheeled up to PP by this RN. Infant in  Patients arms and FOB with belongings accompanying. Report given to RENEE Rojas RN.

## 2020-06-17 NOTE — H&P
"LABOR AND DELIVERY ADMISSION H AND P    ADMISSION DIAGNOSIS:    1.  IUP at 38w6d.  2.  IOL for chronic hypertension with worsening blood pressures.  3.  AMA.  4.  GBS negative.  5.  Hypothyroidism.  6.  Morbid obesity.    HPI: 34 yo  at 38w6d based upon a 12w6d u/s performed on 19 who presents to labor and delivery for IOL secondary to chronic hypertension with worsening blood pressures.    Recent Labs     20  0700   WBC 9.8   RBC 3.71*   HEMOGLOBIN 11.7*   HEMATOCRIT 36.2*   MCV 97.6   MCH 31.5   RDW 47.8   PLATELETCT 205   MPV 11.3   NEUTSPOLYS 70.00   LYMPHOCYTES 22.30   MONOCYTES 5.20   EOSINOPHILS 1.30   BASOPHILS 0.40     /77   Pulse 88   Ht 1.753 m (5' 9\")   Wt (!) 134.3 kg (296 lb)     A, A, and O x 3 NAD    Gravid    Vertex    EFW - 3400 grams    SVE: 2/50%/-2    TOCO: irregular contractions.    EFM: category I tracing.  Reactive and without decelerations.    A/P: 34 yo  at 38w6d based upon a 12w6d u/s performed on 19 who presents to labor and delivery for IOL secondary to chronic hypertension with worsening blood pressures.    1.  IV pitocin.  2.  Epidural.  3.  SROM clear fluid at 1445.  4.  Epidural.  5.  Anticipate .  6.  Prepare for shoulder dystocia and postpartum hemorrhage.  7.  Pediatrician to be aware of  u/s that demonstrated possible bowel dilation.  "

## 2020-06-17 NOTE — DISCHARGE SUMMARY
"DISCHARGE SUMMARY    DATE OF ADMISSION: 2020.    DATE OF DISCHARGE: 2020.    ADMISSION DIAGNOSIS:    1.  IUP at 38w6d.  2.  IOL for chronic hypertension with worsening blood pressures.  3.  AMA.  4.  GBS negative.  5.  Hypothyroidism.  6.  Morbid obesity.    DISCHARGE DIAGNOSIS:    1.  As above.  2.  S/P  without lacerations.    PPD #1    S:  Doing well.  Breast feeding.  Ambulating, voiding spontaneously, and tolerating a regular diet.  Pain is well controlled.  Moderate lochia.  Desires discharge later this evening (at 24 hours).    O:  /79   Pulse 77   Temp 36.2 °C (97.2 °F) (Temporal)   Resp 17   Ht 1.753 m (5' 9\")   Wt (!) 134.3 kg (296 lb)   SpO2 96%     A, A, and O x 3 NAD    FF u/1    No c/c/e    Recent Labs     20  0700 20  0523   WBC 9.8 11.8*   RBC 3.71* 3.49*   HEMOGLOBIN 11.7* 10.9*   HEMATOCRIT 36.2* 32.5*   MCV 97.6 93.1   MCH 31.5 31.2   RDW 47.8 44.8   PLATELETCT 205 164   MPV 11.3 11.4   NEUTSPOLYS 70.00  --    LYMPHOCYTES 22.30  --    MONOCYTES 5.20  --    EOSINOPHILS 1.30  --    BASOPHILS 0.40  --      A/P: PPD #1 s/p  without perineal lacerations.    1.  D/C to home today.  2.  Follow up in 2 weeks for a blood pressure check and 6 weeks for a postpartum examination.  3.  Ambulate TID.  4.  Continue Levothyroxine.  5.  D/C medications include ibuprofen, levothyroxine, and colace.  6.  Continue prenatal vitamins while breast feeding.  "

## 2020-06-17 NOTE — PROGRESS NOTES
1710: Assumed care of pt. AAO, pt desires an epidural when able. POC discussed, pt verbalized understanding.

## 2020-06-17 NOTE — DISCHARGE INSTRUCTIONS
POSTPARTUM DISCHARGE INSTRUCTIONS FOR MOM    YOB: 1985   Age: 35 y.o.               Admit Date: 2020     Discharge Date: 2020  Attending Doctor:  Pam Ramos, *                  Allergies:  Amoxicillin and Pediazole [erythromycin-sulfisoxazole]    Discharged to home by car. Discharged via wheelchair, hospital escort: Yes.  Special equipment needed: Not Applicable  Belongings with: Personal  Be sure to schedule a follow-up appointment with your primary care doctor or any specialists as instructed.     Discharge Plan:   Diet Plan: Discussed  Activity Level: Discussed  Confirmed Follow up Appointment: Patient to Call and Schedule Appointment  Confirmed Symptoms Management: Discussed  Medication Reconciliation Updated: Yes    REASONS TO CALL YOUR OBSTETRICIAN:  1.   Persistent fever or shaking chills (Temperature higher than 100.4)  2.   Heavy bleeding (soaking more than 1 pad per hour); Passing clots  3.   Foul odor from vagina  4.   Mastitis (Breast infection; breast pain, chills, fever, redness)  5.   Urinary pain, burning or frequency  6.   Episiotomy infection  7.   Abdominal incision infection  8.   Severe depression longer than 24 hours    HAND WASHING  · Prior to handling the baby.  · Before breastfeeding or bottle feeding baby.  · After using the bathroom or changing the baby's diaper.    WOUND CARE  Ask your physician for additional care instructions.  In general:    ·  Incision:      · Keep clean and dry.    · Do NOT lift anything heavier than your baby for up to 6 weeks.    · There should not be any opening or pus.      VAGINAL CARE  · Nothing inside vagina for 6 weeks: no sexual intercourse, tampons or douching.  · Bleeding may continue for 2-4 weeks.  Amount may vary.    · Call your physician for heavy bleeding which means soaking more than 1 pad per hour    BIRTH CONTROL  · It is possible to become pregnant at any time after delivery and while breastfeeding.  · Plan  "to discuss a method of birth control with your physician at your follow up visit. visit.    DIET AND ELIMINATION  · Eating more fiber (bran cereal, fruits, and vegetables) and drinking plenty of fluids will help to avoid constipation.  · Urinary frequency after childbirth is normal.    POSTPARTUM BLUES  During the first few days after birth, you may experience a sense of the \"blues\" which may include impatience, irritability or even crying.  These feeling come and go quickly.  However, as many as 1 in 10 women experience emotional symptoms known as postpartum depression.    Postpartum depression:  May start as early as the second or third day after delivery or take several weeks or months to develop.  Symptoms of \"blues\" are present, but are more intense:  Crying spells; loss of appetite; feelings of hopelessness or loss of control; fear of touching the baby; over concern or no concern at all about the baby; little or no concern about your own appearance/caring for yourself; and/or inability to sleep or excessive sleeping.  Contact your physician if you are experiencing any of these symptoms.    Crisis Hotline:  · [place] Crisis Hotline:  6-073-RXMFOTG  Or 1-717.407.8251  · [place] Crisis Hotline:  1-274.639.4573  Or 781-481-7550    PREVENTING SHAKEN BABY:  If you are angry or stressed, PUT THE BABY IN THE CRIB, step away, take some deep breaths, and wait until you are calm to care for the baby.  DO NOT SHAKE THE BABY.  You are not alone, call a supporter for help.    · Crisis Call Center 24/7 crisis line 671-536-5198 or 1-810.236.5239  · You can also text them, text \"ANSWER\" to 952155    QUIT SMOKING/TOBACCO USE:  I understand the use of any tobacco products increases my chance of suffering from future heart disease and could cause other illnesses which may shorten my life. Quitting the use of tobacco products is the single most important thing I can do to improve my health. For further information on smoking / " tobacco cessation call a Toll Free Quit Line at 1-843.620.6243 (*[employer] [place]) or 1-683.498.6132 (American Lung Association) or you can access the web based program at www.lungusa.org.    · [place] Tobacco Users Help Line:  (860) 581-7302       Toll Free: 1-932.980.7486  · Quit Tobacco Program [facility] (339)526-6830    DEPRESSION / SUICIDE RISK:  As you are discharged from this [facility], it is important to learn how to keep safe from harming yourself.    Recognize the warning signs:  · Abrupt changes in personality, positive or negative- including increase in energy   · Giving away possessions  · Change in eating patterns- significant weight changes-  positive or negative  · Change in sleeping patterns- unable to sleep or sleeping all the time   · Unwillingness or inability to communicate  · Depression  · Unusual sadness, discouragement and loneliness  · Talk of wanting to die  · Neglect of personal appearance   · Rebelliousness- reckless behavior  · Withdrawal from people/activities they love  · Confusion- inability to concentrate     If you or a loved one observes any of these behaviors or has concerns about self-harm, here's what you can do:  · Talk about it- your feelings and reasons for harming yourself  · Remove any means that you might use to hurt yourself (examples: pills, rope, extension cords, firearm)  · Get professional help from the community (Mental Health, Substance Abuse, psychological counseling)  · Do not be alone:Call your Safe Contact- someone whom you trust who will be there for you.  · Call your local CRISIS HOTLINE 962-1344 or 585-459-2711  · Call your local Children's Mobile Crisis Response Team [place] (905) 664-4255 or www.[place]  · Call the toll free National Suicide Prevention Hotlines   · National Suicide Prevention Lifeline 033-180-WZGM (2531)  · National Hope Line Network 800-SUICIDE (652-3829)    DISCHARGE  SURVEY:  Thank you for choosing [facility].  We hope we provided you with very good care.  You may be receiving a survey in the mail.  Please fill it out.  Your opinion is valuable to us.    ADDITIONAL EDUCATIONAL MATERIALS GIVEN TO PATIENT:        My signature on this form indicates that:  1.  I have reviewed and understand the above information  2.  My questions regarding this information have been answered to my satisfaction.  3.  I have formulated a plan with my discharge nurse to obtain my prescribed medication for home.    Call for a temp >100.4, heavy vaginal bleeding, foul smelling discharge.  Pelvic rest x 6 weeks.  Ambulate TID.  Callfor signs/symptoms of DVT/PE, mastitis, and postpartum depression

## 2020-06-18 NOTE — PROGRESS NOTES
Received report from Harmony EVANS. Assumed patient care. All questions and concerns answered at this time. Will continue to monitor. Bed rails up x 2, call light within reach.

## 2020-06-18 NOTE — PROGRESS NOTES
Patient discharged home via wheelchair. Patient and FOB provided education and doctor follow-up appointments. Verified bands with patient and infant. Assisted patient with additional questions and concerns. Denies use of transportation to vehicle.

## 2021-03-09 ENCOUNTER — HOSPITAL ENCOUNTER (OUTPATIENT)
Dept: LAB | Facility: MEDICAL CENTER | Age: 36
End: 2021-03-09
Attending: FAMILY MEDICINE
Payer: COMMERCIAL

## 2021-03-09 LAB
25(OH)D3 SERPL-MCNC: 58 NG/ML (ref 30–100)
ALBUMIN SERPL BCP-MCNC: 4.3 G/DL (ref 3.2–4.9)
ALBUMIN/GLOB SERPL: 1.5 G/DL
ALP SERPL-CCNC: 89 U/L (ref 30–99)
ALT SERPL-CCNC: 19 U/L (ref 2–50)
ANION GAP SERPL CALC-SCNC: 11 MMOL/L (ref 7–16)
AST SERPL-CCNC: 21 U/L (ref 12–45)
BILIRUB SERPL-MCNC: 0.4 MG/DL (ref 0.1–1.5)
BUN SERPL-MCNC: 20 MG/DL (ref 8–22)
CALCIUM SERPL-MCNC: 9.4 MG/DL (ref 8.5–10.5)
CHLORIDE SERPL-SCNC: 100 MMOL/L (ref 96–112)
CO2 SERPL-SCNC: 25 MMOL/L (ref 20–33)
CREAT SERPL-MCNC: 0.75 MG/DL (ref 0.5–1.4)
GLOBULIN SER CALC-MCNC: 2.9 G/DL (ref 1.9–3.5)
GLUCOSE SERPL-MCNC: 86 MG/DL (ref 65–99)
POTASSIUM SERPL-SCNC: 4.6 MMOL/L (ref 3.6–5.5)
PROT SERPL-MCNC: 7.2 G/DL (ref 6–8.2)
SODIUM SERPL-SCNC: 136 MMOL/L (ref 135–145)
T4 FREE SERPL-MCNC: 1.14 NG/DL (ref 0.93–1.7)
TSH SERPL DL<=0.005 MIU/L-ACNC: 3.1 UIU/ML (ref 0.38–5.33)

## 2021-03-09 PROCEDURE — 36415 COLL VENOUS BLD VENIPUNCTURE: CPT

## 2021-03-09 PROCEDURE — 84439 ASSAY OF FREE THYROXINE: CPT

## 2021-03-09 PROCEDURE — 82306 VITAMIN D 25 HYDROXY: CPT

## 2021-03-09 PROCEDURE — 84443 ASSAY THYROID STIM HORMONE: CPT

## 2021-03-09 PROCEDURE — 80053 COMPREHEN METABOLIC PANEL: CPT

## 2023-08-05 ENCOUNTER — HOSPITAL ENCOUNTER (OUTPATIENT)
Dept: LAB | Facility: MEDICAL CENTER | Age: 38
End: 2023-08-05
Attending: FAMILY MEDICINE
Payer: COMMERCIAL

## 2023-08-05 LAB
ALBUMIN SERPL BCP-MCNC: 4.6 G/DL (ref 3.2–4.9)
ALBUMIN/GLOB SERPL: 1.6 G/DL
ALP SERPL-CCNC: 84 U/L (ref 30–99)
ALT SERPL-CCNC: 31 U/L (ref 2–50)
ANION GAP SERPL CALC-SCNC: 9 MMOL/L (ref 7–16)
AST SERPL-CCNC: 23 U/L (ref 12–45)
BILIRUB SERPL-MCNC: 0.4 MG/DL (ref 0.1–1.5)
BUN SERPL-MCNC: 16 MG/DL (ref 8–22)
CALCIUM ALBUM COR SERPL-MCNC: 9 MG/DL (ref 8.5–10.5)
CALCIUM SERPL-MCNC: 9.5 MG/DL (ref 8.5–10.5)
CHLORIDE SERPL-SCNC: 103 MMOL/L (ref 96–112)
CHOLEST SERPL-MCNC: 181 MG/DL (ref 100–199)
CO2 SERPL-SCNC: 24 MMOL/L (ref 20–33)
CREAT SERPL-MCNC: 0.94 MG/DL (ref 0.5–1.4)
FASTING STATUS PATIENT QL REPORTED: NORMAL
FOLATE SERPL-MCNC: 21.4 NG/ML
GFR SERPLBLD CREATININE-BSD FMLA CKD-EPI: 78 ML/MIN/1.73 M 2
GLOBULIN SER CALC-MCNC: 2.8 G/DL (ref 1.9–3.5)
GLUCOSE SERPL-MCNC: 92 MG/DL (ref 65–99)
HDLC SERPL-MCNC: 41 MG/DL
LDLC SERPL CALC-MCNC: 119 MG/DL
POTASSIUM SERPL-SCNC: 4.8 MMOL/L (ref 3.6–5.5)
PROT SERPL-MCNC: 7.4 G/DL (ref 6–8.2)
SODIUM SERPL-SCNC: 136 MMOL/L (ref 135–145)
T3FREE SERPL-MCNC: 3.38 PG/ML (ref 2–4.4)
TRIGL SERPL-MCNC: 107 MG/DL (ref 0–149)
VIT B12 SERPL-MCNC: 515 PG/ML (ref 211–911)

## 2023-08-05 PROCEDURE — 36415 COLL VENOUS BLD VENIPUNCTURE: CPT

## 2023-08-05 PROCEDURE — 82746 ASSAY OF FOLIC ACID SERUM: CPT

## 2023-08-05 PROCEDURE — 84481 FREE ASSAY (FT-3): CPT

## 2023-08-05 PROCEDURE — 82607 VITAMIN B-12: CPT

## 2023-08-05 PROCEDURE — 80061 LIPID PANEL: CPT

## 2023-08-05 PROCEDURE — 80053 COMPREHEN METABOLIC PANEL: CPT

## 2024-07-18 ENCOUNTER — HOSPITAL ENCOUNTER (OUTPATIENT)
Dept: RADIOLOGY | Facility: MEDICAL CENTER | Age: 39
End: 2024-07-18
Attending: OBSTETRICS & GYNECOLOGY
Payer: COMMERCIAL

## 2024-07-18 DIAGNOSIS — E06.3 HASHIMOTO'S THYROIDITIS: ICD-10-CM

## 2024-07-18 DIAGNOSIS — E03.9 HYPOTHYROIDISM, UNSPECIFIED TYPE: ICD-10-CM

## 2024-07-18 DIAGNOSIS — R22.1 NECK FULLNESS: ICD-10-CM

## 2024-07-18 PROCEDURE — 76536 US EXAM OF HEAD AND NECK: CPT

## 2024-09-05 ENCOUNTER — OFFICE VISIT (OUTPATIENT)
Dept: URGENT CARE | Facility: PHYSICIAN GROUP | Age: 39
End: 2024-09-05
Payer: COMMERCIAL

## 2024-09-05 ENCOUNTER — HOSPITAL ENCOUNTER (OUTPATIENT)
Dept: RADIOLOGY | Facility: MEDICAL CENTER | Age: 39
End: 2024-09-05
Attending: REGISTERED NURSE
Payer: COMMERCIAL

## 2024-09-05 ENCOUNTER — HOSPITAL ENCOUNTER (OUTPATIENT)
Dept: LAB | Facility: MEDICAL CENTER | Age: 39
End: 2024-09-05
Attending: REGISTERED NURSE
Payer: COMMERCIAL

## 2024-09-05 VITALS
HEIGHT: 68 IN | OXYGEN SATURATION: 98 % | SYSTOLIC BLOOD PRESSURE: 128 MMHG | HEART RATE: 75 BPM | RESPIRATION RATE: 16 BRPM | BODY MASS INDEX: 44.1 KG/M2 | DIASTOLIC BLOOD PRESSURE: 88 MMHG | WEIGHT: 291.01 LBS | TEMPERATURE: 97 F

## 2024-09-05 DIAGNOSIS — R10.31 RLQ ABDOMINAL PAIN: ICD-10-CM

## 2024-09-05 DIAGNOSIS — K59.00 CONSTIPATION, UNSPECIFIED CONSTIPATION TYPE: ICD-10-CM

## 2024-09-05 DIAGNOSIS — R10.11 RUQ ABDOMINAL PAIN: ICD-10-CM

## 2024-09-05 LAB
ALBUMIN SERPL BCP-MCNC: 4.1 G/DL (ref 3.2–4.9)
ALBUMIN/GLOB SERPL: 1.5 G/DL
ALP SERPL-CCNC: 88 U/L (ref 30–99)
ALT SERPL-CCNC: 39 U/L (ref 2–50)
ANION GAP SERPL CALC-SCNC: 10 MMOL/L (ref 7–16)
APPEARANCE UR: CLEAR
AST SERPL-CCNC: 25 U/L (ref 12–45)
BASOPHILS # BLD AUTO: 0.3 % (ref 0–1.8)
BASOPHILS # BLD: 0.03 K/UL (ref 0–0.12)
BILIRUB SERPL-MCNC: 0.2 MG/DL (ref 0.1–1.5)
BILIRUB UR STRIP-MCNC: NEGATIVE MG/DL
BUN SERPL-MCNC: 19 MG/DL (ref 8–22)
CALCIUM ALBUM COR SERPL-MCNC: 9.2 MG/DL (ref 8.5–10.5)
CALCIUM SERPL-MCNC: 9.3 MG/DL (ref 8.5–10.5)
CHLORIDE SERPL-SCNC: 104 MMOL/L (ref 96–112)
CO2 SERPL-SCNC: 25 MMOL/L (ref 20–33)
COLOR UR AUTO: YELLOW
CREAT SERPL-MCNC: 0.83 MG/DL (ref 0.5–1.4)
EOSINOPHIL # BLD AUTO: 0.17 K/UL (ref 0–0.51)
EOSINOPHIL NFR BLD: 1.9 % (ref 0–6.9)
ERYTHROCYTE [DISTWIDTH] IN BLOOD BY AUTOMATED COUNT: 42.6 FL (ref 35.9–50)
GFR SERPLBLD CREATININE-BSD FMLA CKD-EPI: 90 ML/MIN/1.73 M 2
GLOBULIN SER CALC-MCNC: 2.7 G/DL (ref 1.9–3.5)
GLUCOSE SERPL-MCNC: 101 MG/DL (ref 65–99)
GLUCOSE UR STRIP.AUTO-MCNC: NEGATIVE MG/DL
HCT VFR BLD AUTO: 41.5 % (ref 37–47)
HGB BLD-MCNC: 13.9 G/DL (ref 12–16)
IMM GRANULOCYTES # BLD AUTO: 0.04 K/UL (ref 0–0.11)
IMM GRANULOCYTES NFR BLD AUTO: 0.4 % (ref 0–0.9)
KETONES UR STRIP.AUTO-MCNC: NEGATIVE MG/DL
LEUKOCYTE ESTERASE UR QL STRIP.AUTO: NEGATIVE
LIPASE SERPL-CCNC: 32 U/L (ref 11–82)
LYMPHOCYTES # BLD AUTO: 2.21 K/UL (ref 1–4.8)
LYMPHOCYTES NFR BLD: 24.8 % (ref 22–41)
MCH RBC QN AUTO: 30.8 PG (ref 27–33)
MCHC RBC AUTO-ENTMCNC: 33.5 G/DL (ref 32.2–35.5)
MCV RBC AUTO: 91.8 FL (ref 81.4–97.8)
MONOCYTES # BLD AUTO: 0.42 K/UL (ref 0–0.85)
MONOCYTES NFR BLD AUTO: 4.7 % (ref 0–13.4)
NEUTROPHILS # BLD AUTO: 6.05 K/UL (ref 1.82–7.42)
NEUTROPHILS NFR BLD: 67.9 % (ref 44–72)
NITRITE UR QL STRIP.AUTO: NEGATIVE
NRBC # BLD AUTO: 0 K/UL
NRBC BLD-RTO: 0 /100 WBC (ref 0–0.2)
PH UR STRIP.AUTO: 6 [PH] (ref 5–8)
PLATELET # BLD AUTO: 306 K/UL (ref 164–446)
PMV BLD AUTO: 10.9 FL (ref 9–12.9)
POCT INT CON NEG: NEGATIVE
POCT INT CON POS: POSITIVE
POCT URINE PREGNANCY TEST: NEGATIVE
POTASSIUM SERPL-SCNC: 4.9 MMOL/L (ref 3.6–5.5)
PROT SERPL-MCNC: 6.8 G/DL (ref 6–8.2)
PROT UR QL STRIP: NEGATIVE MG/DL
RBC # BLD AUTO: 4.52 M/UL (ref 4.2–5.4)
RBC UR QL AUTO: NORMAL
SODIUM SERPL-SCNC: 139 MMOL/L (ref 135–145)
SP GR UR STRIP.AUTO: 1.01
UROBILINOGEN UR STRIP-MCNC: 0.2 MG/DL
WBC # BLD AUTO: 8.9 K/UL (ref 4.8–10.8)

## 2024-09-05 PROCEDURE — 74177 CT ABD & PELVIS W/CONTRAST: CPT

## 2024-09-05 PROCEDURE — 83690 ASSAY OF LIPASE: CPT

## 2024-09-05 PROCEDURE — 85025 COMPLETE CBC W/AUTO DIFF WBC: CPT

## 2024-09-05 PROCEDURE — 99214 OFFICE O/P EST MOD 30 MIN: CPT | Performed by: REGISTERED NURSE

## 2024-09-05 PROCEDURE — 3074F SYST BP LT 130 MM HG: CPT | Performed by: REGISTERED NURSE

## 2024-09-05 PROCEDURE — 81025 URINE PREGNANCY TEST: CPT | Performed by: REGISTERED NURSE

## 2024-09-05 PROCEDURE — 81002 URINALYSIS NONAUTO W/O SCOPE: CPT | Performed by: REGISTERED NURSE

## 2024-09-05 PROCEDURE — 36415 COLL VENOUS BLD VENIPUNCTURE: CPT

## 2024-09-05 PROCEDURE — 3079F DIAST BP 80-89 MM HG: CPT | Performed by: REGISTERED NURSE

## 2024-09-05 PROCEDURE — 700117 HCHG RX CONTRAST REV CODE 255: Performed by: REGISTERED NURSE

## 2024-09-05 PROCEDURE — 80053 COMPREHEN METABOLIC PANEL: CPT

## 2024-09-05 RX ADMIN — IOHEXOL 100 ML: 350 INJECTION, SOLUTION INTRAVENOUS at 18:15

## 2024-09-05 ASSESSMENT — ENCOUNTER SYMPTOMS
CHILLS: 0
FEVER: 0
BLOOD IN STOOL: 0
DIZZINESS: 0
ABDOMINAL PAIN: 0
SHORTNESS OF BREATH: 0

## 2024-09-05 NOTE — PROGRESS NOTES
Subjective:   Cassandra Marquez is a 39 y.o. female who presents for Abdominal Pain (X 1 week/ R lower abdominal pain/ sharp pain/ pt states squeezing sensation )    History of Present Illness  The patient is a 39-year-old female who presents for evaluation of right-sided abdominal pain.    She has been experiencing pain on her right side for approximately a month, which remains unchanged since her last visit. Initially, the pain was diagnosed as constipation-related, and she was prescribed MiraLAX. This medication improved her bowel movements, but the pain persisted. She describes the pain as a tightening, gripping sensation that began as minor pin jabs last week and has since intensified. The pain is not constant and is triggered by certain movements such as turning her upper body, going over bumps in a car, standing up quickly, or bending over excessively.    She does not believe the pain is related to constipation as she usually has regular bowel movements. Her last bowel movement was this morning.    She reports no fevers or vomiting but does feel nauseous. Additionally, she experiences indigestion immediately after eating, a symptom that started this week.    She confirms that she still has her gallbladder and appendix. She reports no urinary symptoms and rules out the possibility of pregnancy.        Review of Systems   Constitutional:  Negative for chills and fever.   Respiratory:  Negative for shortness of breath.    Cardiovascular:  Negative for chest pain.   Gastrointestinal:  Negative for abdominal pain, blood in stool and melena.   Skin:  Negative for rash.   Neurological:  Negative for dizziness.       Allergies   Allergen Reactions    Amoxicillin Hives    Pediazole [Erythromycin-Sulfisoxazole] Rash       There are no problems to display for this patient.      Current Outpatient Medications Ordered in Epic   Medication Sig Dispense Refill    magnesium citrate Solution Take 300 mL by mouth one time  "for 1 dose. 300 mL 0    levothyroxine (SYNTHROID) 75 MCG Tab Take 75 mcg by mouth Every morning on an empty stomach.      vitamin D (CHOLECALCIFEROL) 1000 UNIT Tab Take 1,000 Units by mouth every day.      ibuprofen (MOTRIN) 600 MG Tab Take 1 Tab by mouth every 6 hours as needed (For cramping after delivery; do not give if patient is receiving ketorolac (Toradol)). (Patient not taking: Reported on 9/5/2024) 30 Tab 3    Prenatal MV-Min-Fe Fum-FA-DHA (PRENATAL 1 PO) Take  by mouth. (Patient not taking: Reported on 9/5/2024)       Current Facility-Administered Medications Ordered in Epic   Medication Dose Route Frequency Provider Last Rate Last Admin    iohexol (OMNIPAQUE) 350 mg/mL (IV)  100 mL Intravenous Once JUDITH EscalantePDALLIN           Past Surgical History:   Procedure Laterality Date    RECTUS REPAIR Bilateral 12/7/2017    Procedure: RECTUS REPAIR- MEDIAL RECTUS RESECTION, LATERAL RECTUS RECESSION;  Surgeon: Kathleen Boyer M.D.;  Location: SURGERY SAME DAY [facility];  Service: Ophthalmology    EYE SURGERY      GASTRIC BYPASS LAPAROSCOPIC         Social History     Tobacco Use    Smoking status: Never    Smokeless tobacco: Never   Vaping Use    Vaping status: Never Used   Substance Use Topics    Alcohol use: Yes    Drug use: No       family history includes Diabetes in her father, maternal aunt, and paternal grandmother; Heart Disease in her paternal grandfather; Hypertension in her paternal grandfather; Stroke in her paternal grandfather.     Problem list, medications, and allergies reviewed by myself today in Epic.     Objective:   /88   Pulse 75   Temp 36.1 °C (97 °F)   Resp 16   Ht 1.727 m (5' 8\")   Wt (!) 132 kg (291 lb 0.1 oz)   SpO2 98%   BMI 44.25 kg/m²     Physical Exam  Vitals and nursing note reviewed.   Constitutional:       Appearance: Normal appearance. She is not ill-appearing or toxic-appearing.   HENT:      Right Ear: Tympanic membrane, ear canal and external ear normal.      " Left Ear: Tympanic membrane, ear canal and external ear normal.      Mouth/Throat:      Mouth: Mucous membranes are moist.   Eyes:      Pupils: Pupils are equal, round, and reactive to light.   Cardiovascular:      Rate and Rhythm: Normal rate and regular rhythm.   Pulmonary:      Effort: Pulmonary effort is normal. No respiratory distress.      Breath sounds: Normal breath sounds.   Abdominal:      General: Abdomen is flat. There is no distension.      Palpations: Abdomen is soft.      Tenderness: There is abdominal tenderness. There is no right CVA tenderness, left CVA tenderness, guarding or rebound.       Musculoskeletal:         General: Normal range of motion.      Cervical back: Normal range of motion.   Skin:     General: Skin is warm and dry.      Capillary Refill: Capillary refill takes less than 2 seconds.      Findings: No rash.   Neurological:      General: No focal deficit present.      Mental Status: She is alert and oriented to person, place, and time.      Cranial Nerves: No cranial nerve deficit.   Psychiatric:         Mood and Affect: Mood normal.         RADIOLOGY RESULTS   CT-ABDOMEN-PELVIS WITH    Result Date: 9/5/2024 9/5/2024 5:42 PM HISTORY/REASON FOR EXAM:  RUQ and RLQ pain. Her bone loss, was supervising according to hospital mammography. There TECHNIQUE/EXAM DESCRIPTION:   CT scan of the abdomen and pelvis with contrast. Contrast-enhanced helical scanning was obtained from the diaphragmatic domes through the pubic symphysis following the bolus administration of nonionic contrast without complication. 100 mL of Omnipaque 350 nonionic contrast was administered without complication. Low dose optimization technique was utilized for this CT exam including automated exposure control and adjustment of the mA and/or kV according to patient size. COMPARISON: No prior studies available. FINDINGS: LUNGS: The lung bases are clear. OSSEOUS STRUCTURES:  No significant finding. ABDOMEN: LIVER:  Decreased in density that is consistent with fatty infiltration. GALLBLADDER and BILIARY SYSTEM The gallbladder is normal in CT appearance. There is no biliary dilation. SPLEEN: Normal in appearance. PANCREAS: The pancreas is normal in appearance. The splenic vein and portal vein enhance normally. ADRENAL glands: Normal in appearance. RIGHT kidney: Normal in appearance. LEFT kidney: Normal in appearance. There is no hydronephrosis. BOWEL and MESENTERY: There has been prior gastric surgery. The appendix is well-visualized and appears normal AORTA and VASCULATURE: Normal in appearance. PELVIS: Uterus and adnexa appear normal. Pelvic device is present.     No acute abnormality within the abdomen or pelvis    X-ray Abdomen 2 View AP With Upright and or Decubitus    Result Date: 9/3/2024  CLINICAL DATA: right sided abdominal pain, TECHNICAL: 2 views. COMPARISON: None FINDINGS: Mild gaseous dilatation of the stomach and portions of the intestine primarily in the left abdomen. Large amount of stool throughout much of the colon and rectum. Evidence of prior gastrojejunostomy with clips present. No free peritoneal fluid or renal calculi.    SUSPECTED CONSTIPATION. PRIOR GASTRIC BYPASS SURGERY.           Assessment/Plan:     I personally reviewed prior external notes and test results pertinent to today's visit as well as additional imaging and testing completed in clinic today.    I introduced myself as Kunal Harvey a Nurse Practitioner.    1. RLQ abdominal pain  POCT Urinalysis    POCT Pregnancy    CT-ABDOMEN-PELVIS WITH    Comp Metabolic Panel    CBC WITH DIFFERENTIAL      2. RUQ abdominal pain  Comp Metabolic Panel    CBC WITH DIFFERENTIAL    LIPASE      3. Constipation, unspecified constipation type  magnesium citrate Solution      Patient presenting with continued right sided abdominal pain was diagnosed with constipation and given MiraLAX which did cause more bowel movements but the pain persist.  Vital signs are  reassuring.  On exam there is no overt tenderness of the abdomen.  Did complete a UA which was unremarkable hCG negative.  Also labs that were reassuring.  CT imaging was ordered which did not identify an acute abnormality within the abdomen.  Did review these findings with the patient and discussed likely constipation as source of symptoms.  Will trial mag citrate as well as stool softener and increase fluids.  If symptoms persist did recommend ER.    Medication discussed included indication for use and the potential benefits and side effects. The Patient was encouraged to monitor symptoms closely, and we reviewed the signs and symptoms that require a higher level of care through the emergency department. Patient verbalized understanding.    Please note that this dictation was created using voice recognition software. I have made every reasonable attempt to correct obvious errors, but I expect that there are errors of grammar and possibly content that I did not discover before finalizing the note.    This note was electronically signed by TIAGO Escalante

## 2025-04-26 ENCOUNTER — HOSPITAL ENCOUNTER (OUTPATIENT)
Dept: LAB | Facility: MEDICAL CENTER | Age: 40
End: 2025-04-26
Attending: NURSE PRACTITIONER
Payer: COMMERCIAL

## 2025-04-26 ENCOUNTER — HOSPITAL ENCOUNTER (OUTPATIENT)
Dept: LAB | Facility: MEDICAL CENTER | Age: 40
End: 2025-04-26
Attending: INTERNAL MEDICINE
Payer: COMMERCIAL

## 2025-04-26 LAB
25(OH)D3 SERPL-MCNC: 26 NG/ML (ref 30–100)
ALBUMIN SERPL BCP-MCNC: 4.2 G/DL (ref 3.2–4.9)
ALBUMIN/GLOB SERPL: 1.4 G/DL
ALP SERPL-CCNC: 84 U/L (ref 30–99)
ALT SERPL-CCNC: 35 U/L (ref 2–50)
ANION GAP SERPL CALC-SCNC: 9 MMOL/L (ref 7–16)
AST SERPL-CCNC: 29 U/L (ref 12–45)
BASOPHILS # BLD AUTO: 0.5 % (ref 0–1.8)
BASOPHILS # BLD: 0.03 K/UL (ref 0–0.12)
BILIRUB SERPL-MCNC: 0.4 MG/DL (ref 0.1–1.5)
BUN SERPL-MCNC: 16 MG/DL (ref 8–22)
CALCIUM ALBUM COR SERPL-MCNC: 9 MG/DL (ref 8.5–10.5)
CALCIUM SERPL-MCNC: 9.2 MG/DL (ref 8.5–10.5)
CHLORIDE SERPL-SCNC: 107 MMOL/L (ref 96–112)
CHOLEST SERPL-MCNC: 196 MG/DL (ref 100–199)
CO2 SERPL-SCNC: 24 MMOL/L (ref 20–33)
CREAT SERPL-MCNC: 0.85 MG/DL (ref 0.5–1.4)
DHEA-S SERPL-MCNC: 47.2 UG/DL (ref 60.9–337)
EOSINOPHIL # BLD AUTO: 0.12 K/UL (ref 0–0.51)
EOSINOPHIL NFR BLD: 1.9 % (ref 0–6.9)
ERYTHROCYTE [DISTWIDTH] IN BLOOD BY AUTOMATED COUNT: 44.8 FL (ref 35.9–50)
EST. AVERAGE GLUCOSE BLD GHB EST-MCNC: 123 MG/DL
ESTRADIOL SERPL-MCNC: 39.1 PG/ML
FASTING STATUS PATIENT QL REPORTED: NORMAL
FSH SERPL-ACNC: 3.7 MIU/ML
GFR SERPLBLD CREATININE-BSD FMLA CKD-EPI: 88 ML/MIN/1.73 M 2
GLOBULIN SER CALC-MCNC: 3.1 G/DL (ref 1.9–3.5)
GLUCOSE SERPL-MCNC: 90 MG/DL (ref 65–99)
HBA1C MFR BLD: 5.9 % (ref 4–5.6)
HCT VFR BLD AUTO: 41.8 % (ref 37–47)
HDLC SERPL-MCNC: 46 MG/DL
HGB BLD-MCNC: 14 G/DL (ref 12–16)
IMM GRANULOCYTES # BLD AUTO: 0.03 K/UL (ref 0–0.11)
IMM GRANULOCYTES NFR BLD AUTO: 0.5 % (ref 0–0.9)
LDLC SERPL CALC-MCNC: 130 MG/DL
LH SERPL-ACNC: 2 IU/L
LYMPHOCYTES # BLD AUTO: 1.71 K/UL (ref 1–4.8)
LYMPHOCYTES NFR BLD: 27.1 % (ref 22–41)
MCH RBC QN AUTO: 30.6 PG (ref 27–33)
MCHC RBC AUTO-ENTMCNC: 33.5 G/DL (ref 32.2–35.5)
MCV RBC AUTO: 91.3 FL (ref 81.4–97.8)
MONOCYTES # BLD AUTO: 0.35 K/UL (ref 0–0.85)
MONOCYTES NFR BLD AUTO: 5.5 % (ref 0–13.4)
NEUTROPHILS # BLD AUTO: 4.07 K/UL (ref 1.82–7.42)
NEUTROPHILS NFR BLD: 64.5 % (ref 44–72)
NRBC # BLD AUTO: 0 K/UL
NRBC BLD-RTO: 0 /100 WBC (ref 0–0.2)
PLATELET # BLD AUTO: 288 K/UL (ref 164–446)
PMV BLD AUTO: 11.3 FL (ref 9–12.9)
POTASSIUM SERPL-SCNC: 4.6 MMOL/L (ref 3.6–5.5)
PROLACTIN SERPL-MCNC: 9.01 NG/ML (ref 2.8–26)
PROT SERPL-MCNC: 7.3 G/DL (ref 6–8.2)
RBC # BLD AUTO: 4.58 M/UL (ref 4.2–5.4)
SODIUM SERPL-SCNC: 140 MMOL/L (ref 135–145)
T3FREE SERPL-MCNC: 2.83 PG/ML (ref 2–4.4)
T4 FREE SERPL-MCNC: 1.01 NG/DL (ref 0.93–1.7)
THYROPEROXIDASE AB SERPL-ACNC: 569 IU/ML (ref 0–9)
TRIGL SERPL-MCNC: 102 MG/DL (ref 0–149)
TSH SERPL-ACNC: 6.08 UIU/ML (ref 0.38–5.33)
TSH SERPL-ACNC: 6.09 UIU/ML (ref 0.38–5.33)
VIT B12 SERPL-MCNC: 369 PG/ML (ref 211–911)
WBC # BLD AUTO: 6.3 K/UL (ref 4.8–10.8)

## 2025-04-26 PROCEDURE — 82607 VITAMIN B-12: CPT

## 2025-04-26 PROCEDURE — 84481 FREE ASSAY (FT-3): CPT

## 2025-04-26 PROCEDURE — 82157 ASSAY OF ANDROSTENEDIONE: CPT

## 2025-04-26 PROCEDURE — 80061 LIPID PANEL: CPT

## 2025-04-26 PROCEDURE — 84146 ASSAY OF PROLACTIN: CPT

## 2025-04-26 PROCEDURE — 83001 ASSAY OF GONADOTROPIN (FSH): CPT

## 2025-04-26 PROCEDURE — 83002 ASSAY OF GONADOTROPIN (LH): CPT

## 2025-04-26 PROCEDURE — 86376 MICROSOMAL ANTIBODY EACH: CPT

## 2025-04-26 PROCEDURE — 84443 ASSAY THYROID STIM HORMONE: CPT | Mod: 91

## 2025-04-26 PROCEDURE — 80053 COMPREHEN METABOLIC PANEL: CPT

## 2025-04-26 PROCEDURE — 83036 HEMOGLOBIN GLYCOSYLATED A1C: CPT

## 2025-04-26 PROCEDURE — 82306 VITAMIN D 25 HYDROXY: CPT

## 2025-04-26 PROCEDURE — 84439 ASSAY OF FREE THYROXINE: CPT

## 2025-04-26 PROCEDURE — 84443 ASSAY THYROID STIM HORMONE: CPT

## 2025-04-26 PROCEDURE — 82627 DEHYDROEPIANDROSTERONE: CPT

## 2025-04-26 PROCEDURE — 85025 COMPLETE CBC W/AUTO DIFF WBC: CPT

## 2025-04-26 PROCEDURE — 82626 DEHYDROEPIANDROSTERONE: CPT

## 2025-04-26 PROCEDURE — 36415 COLL VENOUS BLD VENIPUNCTURE: CPT

## 2025-04-26 PROCEDURE — 82670 ASSAY OF TOTAL ESTRADIOL: CPT

## 2025-04-26 PROCEDURE — 84403 ASSAY OF TOTAL TESTOSTERONE: CPT

## 2025-04-26 PROCEDURE — 84270 ASSAY OF SEX HORMONE GLOBUL: CPT

## 2025-04-28 LAB — SHBG SERPL-SCNC: 47 NMOL/L (ref 25–122)

## 2025-05-05 LAB
ANDROST SERPL-MCNC: 0.36 NG/ML (ref 0.13–0.82)
DHEA SERPL-MCNC: 0.94 NG/ML (ref 0.63–4.7)
TESTOST SERPL-MCNC: 12 NG/DL (ref 9–55)

## 2025-05-23 ENCOUNTER — HOSPITAL ENCOUNTER (OUTPATIENT)
Dept: RADIOLOGY | Facility: MEDICAL CENTER | Age: 40
End: 2025-05-23
Attending: INTERNAL MEDICINE
Payer: COMMERCIAL

## 2025-05-23 DIAGNOSIS — E03.9 PRIMARY HYPOTHYROIDISM: ICD-10-CM

## 2025-05-23 PROCEDURE — 76536 US EXAM OF HEAD AND NECK: CPT

## 2025-07-28 ENCOUNTER — HOSPITAL ENCOUNTER (OUTPATIENT)
Facility: MEDICAL CENTER | Age: 40
End: 2025-07-28
Attending: INTERNAL MEDICINE
Payer: COMMERCIAL

## 2025-07-28 LAB
25(OH)D3 SERPL-MCNC: 36 NG/ML (ref 30–100)
T3FREE SERPL-MCNC: 2.65 PG/ML (ref 2–4.4)
T4 FREE SERPL-MCNC: 1.01 NG/DL (ref 0.93–1.7)
TSH SERPL DL<=0.005 MIU/L-ACNC: 4.81 UIU/ML (ref 0.38–5.33)
VIT B12 SERPL-MCNC: 489 PG/ML (ref 211–911)

## 2025-07-28 PROCEDURE — 84443 ASSAY THYROID STIM HORMONE: CPT

## 2025-07-28 PROCEDURE — 84439 ASSAY OF FREE THYROXINE: CPT

## 2025-07-28 PROCEDURE — 82607 VITAMIN B-12: CPT

## 2025-07-28 PROCEDURE — 84481 FREE ASSAY (FT-3): CPT

## 2025-07-28 PROCEDURE — 36415 COLL VENOUS BLD VENIPUNCTURE: CPT

## 2025-07-28 PROCEDURE — 82306 VITAMIN D 25 HYDROXY: CPT

## 2025-08-19 ENCOUNTER — HOSPITAL ENCOUNTER (OUTPATIENT)
Dept: RADIOLOGY | Facility: MEDICAL CENTER | Age: 40
End: 2025-08-19
Attending: INTERNAL MEDICINE
Payer: COMMERCIAL

## 2025-08-19 DIAGNOSIS — E04.2 MULTIPLE THYROID NODULES: ICD-10-CM

## 2025-08-19 LAB — CYTOLOGY REG CYTOL: NORMAL

## 2025-08-19 PROCEDURE — 88173 CYTOPATH EVAL FNA REPORT: CPT | Performed by: STUDENT IN AN ORGANIZED HEALTH CARE EDUCATION/TRAINING PROGRAM

## 2025-08-19 PROCEDURE — 32555 ASPIRATE PLEURA W/ IMAGING: CPT

## 2025-08-19 PROCEDURE — 88173 CYTOPATH EVAL FNA REPORT: CPT | Mod: 26 | Performed by: STUDENT IN AN ORGANIZED HEALTH CARE EDUCATION/TRAINING PROGRAM

## (undated) DEVICE — MASK ANESTHESIA ADULT  - (100/CA)

## (undated) DEVICE — SPEAR EYE SPNG 3ANG MLBL HNDL - (10/ST18ST/PK 180/PK 1PK/SP)

## (undated) DEVICE — SENSOR SPO2 NEO LNCS ADHESIVE (20/BX) SEE USER NOTES

## (undated) DEVICE — GOWN SURGEONS X-LARGE - DISP. (30/CA)

## (undated) DEVICE — GLOVE BIOGEL PI INDICATOR SZ 6.5 SURGICAL PF LF - (50/BX 4BX/CA)

## (undated) DEVICE — CAUTERY OPTHALMIC WECK FINE TIP (10EA/SP)

## (undated) DEVICE — SODIUM CHL IRRIGATION 0.9% 1000ML (12EA/CA)

## (undated) DEVICE — SUCTION INSTRUMENT YANKAUER BULBOUS TIP W/O VENT (50EA/CA)

## (undated) DEVICE — CANISTER SUCTION RIGID RED 1500CC (40EA/CA)

## (undated) DEVICE — MASK ANESTHESIA CHILD/SMALL ADULT SIZE 4 (50/CA)

## (undated) DEVICE — ELECTRODE 850 FOAM ADHESIVE - HYDROGEL RADIOTRNSPRNT (50/PK)

## (undated) DEVICE — KIT ANESTHESIA W/CIRCUIT & 3/LT BAG W/FILTER (20EA/CA)

## (undated) DEVICE — CATHETER IV 20 GA X 1-1/4 ---SURG.& SDS ONLY--- (50EA/BX)

## (undated) DEVICE — NEPTUNE 4 PORT MANIFOLD - (20/PK)

## (undated) DEVICE — SET LEADWIRE 5 LEAD BEDSIDE DISPOSABLE ECG (1SET OF 5/EA)

## (undated) DEVICE — SUTURE 6-0 PLAIN GUT G-1 D/A 18 (12PK/BX)"

## (undated) DEVICE — CANISTER SUCTION 3000ML MECHANICAL FILTER AUTO SHUTOFF MEDI-VAC NONSTERILE LF DISP  (40EA/CA)

## (undated) DEVICE — WATER IRRIGATION STERILE 1000ML (12EA/CA)

## (undated) DEVICE — LACTATED RINGERS INJ 1000 ML - (14EA/CA 60CA/PF)

## (undated) DEVICE — SUTURE EYE

## (undated) DEVICE — MICRODRIP PRIMARY VENTED 60 (48EA/CA) THIS WAS PART #2C8428 WHICH WAS DISCONTINUED

## (undated) DEVICE — MASK, LARYNGEAL AIRWAY #4

## (undated) DEVICE — Device

## (undated) DEVICE — DRAPE MAGNETIC (INSTRA-MAG) - (30/CA)

## (undated) DEVICE — DRESSING TEGADERM 8 X 12 - (10/BX 8BX/CA)

## (undated) DEVICE — KIT  I.V. START (100EA/CA)

## (undated) DEVICE — GLOVE SZ 6 BIOGEL PI MICRO - PF LF (50PR/BX 4BX/CA)